# Patient Record
Sex: MALE | Race: WHITE | NOT HISPANIC OR LATINO
[De-identification: names, ages, dates, MRNs, and addresses within clinical notes are randomized per-mention and may not be internally consistent; named-entity substitution may affect disease eponyms.]

---

## 2018-10-02 ENCOUNTER — RX RENEWAL (OUTPATIENT)
Age: 58
End: 2018-10-02

## 2018-10-02 ENCOUNTER — APPOINTMENT (OUTPATIENT)
Dept: ENDOCRINOLOGY | Facility: CLINIC | Age: 58
End: 2018-10-02

## 2018-10-04 ENCOUNTER — RX RENEWAL (OUTPATIENT)
Age: 58
End: 2018-10-04

## 2018-10-15 ENCOUNTER — RX RENEWAL (OUTPATIENT)
Age: 58
End: 2018-10-15

## 2018-11-02 ENCOUNTER — APPOINTMENT (OUTPATIENT)
Dept: ENDOCRINOLOGY | Facility: CLINIC | Age: 58
End: 2018-11-02
Payer: COMMERCIAL

## 2018-11-02 VITALS
SYSTOLIC BLOOD PRESSURE: 116 MMHG | HEIGHT: 74 IN | HEART RATE: 76 BPM | WEIGHT: 219 LBS | DIASTOLIC BLOOD PRESSURE: 78 MMHG | BODY MASS INDEX: 28.11 KG/M2

## 2018-11-02 LAB — GLUCOSE BLDC GLUCOMTR-MCNC: 122

## 2018-11-02 PROCEDURE — 82962 GLUCOSE BLOOD TEST: CPT

## 2018-11-02 PROCEDURE — 99214 OFFICE O/P EST MOD 30 MIN: CPT | Mod: 25

## 2018-11-28 ENCOUNTER — MEDICATION RENEWAL (OUTPATIENT)
Age: 58
End: 2018-11-28

## 2019-03-11 ENCOUNTER — RECORD ABSTRACTING (OUTPATIENT)
Age: 59
End: 2019-03-11

## 2019-03-11 DIAGNOSIS — Z86.39 PERSONAL HISTORY OF OTHER ENDOCRINE, NUTRITIONAL AND METABOLIC DISEASE: ICD-10-CM

## 2019-03-11 DIAGNOSIS — Z78.9 OTHER SPECIFIED HEALTH STATUS: ICD-10-CM

## 2019-03-11 DIAGNOSIS — Z80.9 FAMILY HISTORY OF MALIGNANT NEOPLASM, UNSPECIFIED: ICD-10-CM

## 2019-03-11 DIAGNOSIS — Z83.3 FAMILY HISTORY OF DIABETES MELLITUS: ICD-10-CM

## 2019-03-13 ENCOUNTER — APPOINTMENT (OUTPATIENT)
Dept: ENDOCRINOLOGY | Facility: CLINIC | Age: 59
End: 2019-03-13
Payer: COMMERCIAL

## 2019-03-13 VITALS
WEIGHT: 219 LBS | SYSTOLIC BLOOD PRESSURE: 110 MMHG | BODY MASS INDEX: 28.11 KG/M2 | HEART RATE: 79 BPM | DIASTOLIC BLOOD PRESSURE: 68 MMHG | HEIGHT: 74 IN

## 2019-03-13 LAB — GLUCOSE BLDC GLUCOMTR-MCNC: 140

## 2019-03-13 PROCEDURE — 99214 OFFICE O/P EST MOD 30 MIN: CPT | Mod: 25

## 2019-03-13 PROCEDURE — 82962 GLUCOSE BLOOD TEST: CPT

## 2019-03-13 RX ORDER — ROSUVASTATIN CALCIUM 10 MG/1
10 TABLET, FILM COATED ORAL
Refills: 0 | Status: DISCONTINUED | COMMUNITY
End: 2019-03-13

## 2019-03-13 NOTE — REVIEW OF SYSTEMS
[Shortness Of Breath] : no shortness of breath [de-identified] : dizziness lasting seconds with position changes, no vertigo

## 2019-03-13 NOTE — HISTORY OF PRESENT ILLNESS
[FreeTextEntry1] : Type: 2\par Severity: moderate\par Duration 5 years:\par Onset: routine BW\par Modifying factors: better with medication\par \par Current meds for glycemic control:\par Januvia 100 mg daily\par Metformin 500 mg, 2 tabs BID\par Pioglitazone 30 mg daily.\par Was on Invokana in the past, but caused rash in the groin.\par No SMBG.\par \par Exercise: none,  so walks a lot at work\par \par

## 2019-03-13 NOTE — ASSESSMENT
[FreeTextEntry1] : DM type 2, controlled. Good response to pioglitazone in terms of glycemic status and liver function, without adverse effects\par Hyperlipidemia controlled\par ED improves with meds

## 2019-05-06 ENCOUNTER — RX RENEWAL (OUTPATIENT)
Age: 59
End: 2019-05-06

## 2019-05-29 ENCOUNTER — RX RENEWAL (OUTPATIENT)
Age: 59
End: 2019-05-29

## 2019-05-29 ENCOUNTER — MEDICATION RENEWAL (OUTPATIENT)
Age: 59
End: 2019-05-29

## 2019-07-30 LAB
GLUCOSE SERPL-MCNC: 152
HBA1C MFR BLD HPLC: 6.7
LDLC SERPL DIRECT ASSAY-MCNC: 81
MICROALBUMIN/CREAT UR-RTO: 10

## 2019-07-31 ENCOUNTER — APPOINTMENT (OUTPATIENT)
Dept: ENDOCRINOLOGY | Facility: CLINIC | Age: 59
End: 2019-07-31
Payer: COMMERCIAL

## 2019-07-31 VITALS
DIASTOLIC BLOOD PRESSURE: 82 MMHG | OXYGEN SATURATION: 98 % | WEIGHT: 220 LBS | HEIGHT: 74 IN | HEART RATE: 89 BPM | BODY MASS INDEX: 28.23 KG/M2 | SYSTOLIC BLOOD PRESSURE: 124 MMHG

## 2019-07-31 LAB — GLUCOSE BLDC GLUCOMTR-MCNC: 134

## 2019-07-31 PROCEDURE — 99214 OFFICE O/P EST MOD 30 MIN: CPT | Mod: 25

## 2019-07-31 PROCEDURE — 82962 GLUCOSE BLOOD TEST: CPT

## 2019-07-31 RX ORDER — SILDENAFIL CITRATE 100 MG/1
100 TABLET, FILM COATED ORAL
Refills: 0 | Status: DISCONTINUED | COMMUNITY
End: 2019-07-31

## 2019-07-31 NOTE — REVIEW OF SYSTEMS
[Joint Pain] : joint pain [Chest Pain] : no chest pain [Shortness Of Breath] : no shortness of breath

## 2019-09-30 ENCOUNTER — RX RENEWAL (OUTPATIENT)
Age: 59
End: 2019-09-30

## 2019-10-02 ENCOUNTER — MEDICATION RENEWAL (OUTPATIENT)
Age: 59
End: 2019-10-02

## 2019-11-21 ENCOUNTER — RX RENEWAL (OUTPATIENT)
Age: 59
End: 2019-11-21

## 2020-01-21 LAB
HBA1C MFR BLD HPLC: 7.4
LDLC SERPL DIRECT ASSAY-MCNC: 67
MICROALBUMIN/CREAT 24H UR-RTO: 18

## 2020-01-22 ENCOUNTER — APPOINTMENT (OUTPATIENT)
Dept: ENDOCRINOLOGY | Facility: CLINIC | Age: 60
End: 2020-01-22
Payer: COMMERCIAL

## 2020-01-22 VITALS
WEIGHT: 228 LBS | SYSTOLIC BLOOD PRESSURE: 118 MMHG | HEIGHT: 74 IN | HEART RATE: 76 BPM | BODY MASS INDEX: 29.26 KG/M2 | DIASTOLIC BLOOD PRESSURE: 74 MMHG

## 2020-01-22 DIAGNOSIS — Z00.00 ENCOUNTER FOR GENERAL ADULT MEDICAL EXAMINATION W/OUT ABNORMAL FINDINGS: ICD-10-CM

## 2020-01-22 DIAGNOSIS — N52.1 ERECTILE DYSFUNCTION DUE TO DISEASES CLASSIFIED ELSEWHERE: ICD-10-CM

## 2020-01-22 LAB — GLUCOSE BLDC GLUCOMTR-MCNC: 128

## 2020-01-22 PROCEDURE — 82962 GLUCOSE BLOOD TEST: CPT

## 2020-01-22 PROCEDURE — 99214 OFFICE O/P EST MOD 30 MIN: CPT | Mod: 25

## 2020-01-22 NOTE — REVIEW OF SYSTEMS
[Blurry Vision] : no blurred vision [Shortness Of Breath] : no shortness of breath [FreeTextEntry8] : ED improves with sildenafil [Chest Pain] : no chest pain

## 2020-01-22 NOTE — HISTORY OF PRESENT ILLNESS
[FreeTextEntry1] : Type: 2\par Severity: moderate\par Duration 6 years:\par Onset: routine BW\par Modifying factors: better with medication\par \par Current meds for glycemic control:\par Januvia 100 mg daily\par Metformin 500 mg, 2 tabs BID\par Pioglitazone 30 mg daily.\par Was on Invokana in the past, but caused rash in the groin.\par No SMBG.\par \par Exercise: none,  so walks a lot at work\par \par

## 2020-01-22 NOTE — ASSESSMENT
[FreeTextEntry1] : DM type 2, controlled. Good response to pioglitazone in terms of glycemic status and liver function, without adverse effects. Mild loss of control associated with weight gain\par Hyperlipidemia controlled\par ED improves with meds

## 2020-01-24 RX ORDER — SITAGLIPTIN 100 MG/1
100 TABLET, FILM COATED ORAL
Qty: 90 | Refills: 3 | Status: DISCONTINUED | COMMUNITY
Start: 2019-05-06 | End: 2020-01-24

## 2020-03-28 ENCOUNTER — RX RENEWAL (OUTPATIENT)
Age: 60
End: 2020-03-28

## 2020-05-02 ENCOUNTER — RX RENEWAL (OUTPATIENT)
Age: 60
End: 2020-05-02

## 2020-05-27 ENCOUNTER — APPOINTMENT (OUTPATIENT)
Dept: ENDOCRINOLOGY | Facility: CLINIC | Age: 60
End: 2020-05-27
Payer: COMMERCIAL

## 2020-05-27 LAB
HBA1C MFR BLD HPLC: 7.7
LDLC SERPL DIRECT ASSAY-MCNC: 68
MICROALBUMIN/CREAT 24H UR-RTO: 16

## 2020-05-27 PROCEDURE — 99442: CPT

## 2020-05-27 RX ORDER — LINAGLIPTIN 5 MG/1
5 TABLET, FILM COATED ORAL DAILY
Qty: 90 | Refills: 1 | Status: DISCONTINUED | COMMUNITY
Start: 2020-01-24 | End: 2020-05-27

## 2020-05-27 RX ORDER — SAXAGLIPTIN 5 MG/1
5 TABLET, FILM COATED ORAL
Qty: 90 | Refills: 1 | Status: DISCONTINUED | COMMUNITY
Start: 2020-01-29 | End: 2020-05-27

## 2020-06-04 ENCOUNTER — RX RENEWAL (OUTPATIENT)
Age: 60
End: 2020-06-04

## 2020-09-21 ENCOUNTER — RX RENEWAL (OUTPATIENT)
Age: 60
End: 2020-09-21

## 2020-10-13 LAB
HBA1C MFR BLD HPLC: 6.7
LDLC SERPL DIRECT ASSAY-MCNC: 66
MICROALBUMIN/CREAT 24H UR-RTO: 6

## 2020-10-14 ENCOUNTER — APPOINTMENT (OUTPATIENT)
Dept: ENDOCRINOLOGY | Facility: CLINIC | Age: 60
End: 2020-10-14
Payer: COMMERCIAL

## 2020-10-14 VITALS
HEIGHT: 74 IN | BODY MASS INDEX: 29.26 KG/M2 | SYSTOLIC BLOOD PRESSURE: 132 MMHG | DIASTOLIC BLOOD PRESSURE: 74 MMHG | WEIGHT: 228 LBS | HEART RATE: 78 BPM

## 2020-10-14 LAB — GLUCOSE BLDC GLUCOMTR-MCNC: 85

## 2020-10-14 PROCEDURE — 99214 OFFICE O/P EST MOD 30 MIN: CPT | Mod: 25

## 2020-10-14 PROCEDURE — 82962 GLUCOSE BLOOD TEST: CPT

## 2020-10-14 NOTE — HISTORY OF PRESENT ILLNESS
[FreeTextEntry1] : Type: 2\par Severity: moderate\par Duration 6 years:\par Onset: routine BW\par Modifying factors: better with medication\par \par Current meds for glycemic control:\par Glimepiride 1mg daily \par Metformin 500 mg, 2 tabs BID\par Pioglitazone 30 mg daily.\par Never started Rybelsus, discontinued Tradjenta \par Was on Invokana in the past, but caused rash in the groin.\par No SMBG.\par Current BS- 85 pt. reports that he has not ate all day \par \par Does not test very often, usually 120-130 when he does test\par \par Diet:\par B- skips\par L- skips\par D- chicken\par \par Eye exam- 09/2020 (-) \par \par Exercise: none,  so walks a lot at work

## 2020-10-14 NOTE — ASSESSMENT
[Importance of Diet and Exercise] : importance of diet and exercise to improve glycemic control, achieve weight loss and improve cardiovascular health [FreeTextEntry1] : 59 y/o male with Type 2 DM and Hyperlipidemia. \par \par Plan: \par Type 2 DM: A1C improved \par - continue current Rx\par - increase self blood sugar monitoring \par - encouraged to increase routine exercise \par - educated on healthy food choices \par - repeat A1C in 3 months\par \par Hyperlipidemia: controlled - continue statin \par - repeat lipids in 3 months \par \par RTO in 3 months

## 2020-10-14 NOTE — REVIEW OF SYSTEMS
[Blurred Vision] : blurred vision [Fatigue] : no fatigue [Decreased Appetite] : appetite not decreased [Dysphagia] : no dysphagia [Neck Pain] : no neck pain [Chest Pain] : no chest pain [Palpitations] : no palpitations [Shortness Of Breath] : no shortness of breath [Cough] : no cough [Constipation] : no constipation [Diarrhea] : no diarrhea [Polyuria] : no polyuria [Joint Pain] : no joint pain [Muscle Weakness] : no muscle weakness [Hair Loss] : no hair loss [Dry Skin] : no dry skin [Headaches] : no headaches [Tremors] : no tremors [Depression] : no depression [Anxiety] : no anxiety [Cold Intolerance] : no cold intolerance [Heat Intolerance] : no heat intolerance [FreeTextEntry3] : at times, saw opthalmology [Swelling] : no swelling

## 2020-11-04 ENCOUNTER — RX RENEWAL (OUTPATIENT)
Age: 60
End: 2020-11-04

## 2020-12-16 ENCOUNTER — RX RENEWAL (OUTPATIENT)
Age: 60
End: 2020-12-16

## 2021-01-21 ENCOUNTER — RX RENEWAL (OUTPATIENT)
Age: 61
End: 2021-01-21

## 2021-02-18 LAB
HBA1C MFR BLD HPLC: 7.2
LDLC SERPL DIRECT ASSAY-MCNC: 69
MICROALBUMIN/CREAT 24H UR-RTO: 64

## 2021-02-19 ENCOUNTER — APPOINTMENT (OUTPATIENT)
Dept: ENDOCRINOLOGY | Facility: CLINIC | Age: 61
End: 2021-02-19
Payer: COMMERCIAL

## 2021-02-19 VITALS
OXYGEN SATURATION: 98 % | DIASTOLIC BLOOD PRESSURE: 80 MMHG | BODY MASS INDEX: 30.8 KG/M2 | HEIGHT: 74 IN | HEART RATE: 74 BPM | WEIGHT: 240 LBS | SYSTOLIC BLOOD PRESSURE: 130 MMHG

## 2021-02-19 DIAGNOSIS — E11.9 TYPE 2 DIABETES MELLITUS W/OUT COMPLICATIONS: ICD-10-CM

## 2021-02-19 PROCEDURE — 99214 OFFICE O/P EST MOD 30 MIN: CPT

## 2021-02-19 PROCEDURE — 99072 ADDL SUPL MATRL&STAF TM PHE: CPT

## 2021-02-19 RX ORDER — ORAL SEMAGLUTIDE 3 MG/1
3 TABLET ORAL
Qty: 30 | Refills: 0 | Status: DISCONTINUED | COMMUNITY
Start: 2020-05-27 | End: 2021-02-19

## 2021-02-19 NOTE — HISTORY OF PRESENT ILLNESS
[FreeTextEntry1] : Type: 2\par Severity: moderate\par Duration 7 years:\par Onset: routine BW\par Modifying factors: better with medication\par \par Current meds for glycemic control:\par Glimepiride 1mg daily \par Metformin 500 mg, 2 tabs BID\par Pioglitazone 30 mg daily.\par Never started Rybelsus, discontinued Tradjenta \par Was on Invokana in the past, but caused rash in the groin.\par No SMBG.\par Current BS- 85 pt. reports that he has not ate all day \par \par Does not test very often, usually 120-130 when he does test\par \par Diet:\par B- skips\par L- skips\par D- chicken\par \par Eye exam- 09/2020 (-) \par \par Exercise: none,  so walks a lot at work

## 2021-02-19 NOTE — ASSESSMENT
[Importance of Diet and Exercise] : importance of diet and exercise to improve glycemic control, achieve weight loss and improve cardiovascular health [FreeTextEntry1] : stable control of diabetes and hyperlipidemia\par positive microalbumin; will repeat\par encourage lifestyle efforts\par

## 2021-02-21 ENCOUNTER — RX RENEWAL (OUTPATIENT)
Age: 61
End: 2021-02-21

## 2021-04-02 ENCOUNTER — RX RENEWAL (OUTPATIENT)
Age: 61
End: 2021-04-02

## 2021-05-13 ENCOUNTER — RX RENEWAL (OUTPATIENT)
Age: 61
End: 2021-05-13

## 2021-05-17 ENCOUNTER — RX RENEWAL (OUTPATIENT)
Age: 61
End: 2021-05-17

## 2021-06-15 ENCOUNTER — RX RENEWAL (OUTPATIENT)
Age: 61
End: 2021-06-15

## 2021-06-29 LAB
HBA1C MFR BLD HPLC: 7.5
LDLC SERPL DIRECT ASSAY-MCNC: 81
MICROALBUMIN/CREAT 24H UR-RTO: 12

## 2021-06-30 ENCOUNTER — APPOINTMENT (OUTPATIENT)
Dept: ENDOCRINOLOGY | Facility: CLINIC | Age: 61
End: 2021-06-30
Payer: COMMERCIAL

## 2021-06-30 VITALS
HEART RATE: 80 BPM | OXYGEN SATURATION: 96 % | HEIGHT: 74 IN | WEIGHT: 239 LBS | BODY MASS INDEX: 30.67 KG/M2 | DIASTOLIC BLOOD PRESSURE: 74 MMHG | SYSTOLIC BLOOD PRESSURE: 120 MMHG

## 2021-06-30 LAB — GLUCOSE BLDC GLUCOMTR-MCNC: 160

## 2021-06-30 PROCEDURE — 99072 ADDL SUPL MATRL&STAF TM PHE: CPT

## 2021-06-30 PROCEDURE — 82962 GLUCOSE BLOOD TEST: CPT

## 2021-06-30 PROCEDURE — 99214 OFFICE O/P EST MOD 30 MIN: CPT | Mod: 25

## 2021-06-30 NOTE — ASSESSMENT
[FreeTextEntry1] : suboptimal control of diabetes. Add rybelsus, and hopefully eventually reduce glimepiride\par negative microalbumin\par encourage lifestyle efforts\par

## 2021-09-23 ENCOUNTER — RX RENEWAL (OUTPATIENT)
Age: 61
End: 2021-09-23

## 2021-10-12 LAB
HBA1C MFR BLD HPLC: 7.4
LDLC SERPL CALC-MCNC: 71
MICROALBUMIN/CREAT 24H UR-RTO: 16

## 2021-10-13 ENCOUNTER — APPOINTMENT (OUTPATIENT)
Dept: ENDOCRINOLOGY | Facility: CLINIC | Age: 61
End: 2021-10-13
Payer: COMMERCIAL

## 2021-10-13 VITALS
OXYGEN SATURATION: 96 % | WEIGHT: 237 LBS | SYSTOLIC BLOOD PRESSURE: 122 MMHG | DIASTOLIC BLOOD PRESSURE: 74 MMHG | HEIGHT: 74 IN | HEART RATE: 90 BPM | BODY MASS INDEX: 30.42 KG/M2

## 2021-10-13 LAB — GLUCOSE BLDC GLUCOMTR-MCNC: 145

## 2021-10-13 PROCEDURE — 99214 OFFICE O/P EST MOD 30 MIN: CPT | Mod: 25

## 2021-10-13 PROCEDURE — 82962 GLUCOSE BLOOD TEST: CPT

## 2021-10-13 RX ORDER — ORAL SEMAGLUTIDE 3 MG/1
3 TABLET ORAL
Qty: 30 | Refills: 3 | Status: DISCONTINUED | COMMUNITY
Start: 2021-06-30 | End: 2021-10-13

## 2021-10-13 NOTE — HISTORY OF PRESENT ILLNESS
[FreeTextEntry1] : Type: 2\par Severity: moderate\par Duration 7 years:\par Onset: routine BW\par Modifying factors: better with medication\par \par Current meds for glycemic control:\par Glimepiride 1mg daily \par Metformin 500 mg, 2 tabs BID\par Pioglitazone 15 mg two daily\par Unable to tolerate rybelsus due to GI side effects\par Was on Invokana in the past, but caused rash in the groin.\par No SMBG.\par Current BS- 85 pt. reports that he has not ate all day \par \par Does not test very often, usually 120-130 when he does test\par \par Diet:\par B- skips\par L- skips\par D- chicken\par \par Eye exam- 09/2020 (-) \par \par Exercise: none,  so walks a lot at work

## 2021-10-27 ENCOUNTER — RX RENEWAL (OUTPATIENT)
Age: 61
End: 2021-10-27

## 2021-11-07 ENCOUNTER — RX RENEWAL (OUTPATIENT)
Age: 61
End: 2021-11-07

## 2021-11-22 ENCOUNTER — RX RENEWAL (OUTPATIENT)
Age: 61
End: 2021-11-22

## 2022-01-24 ENCOUNTER — RX RENEWAL (OUTPATIENT)
Age: 62
End: 2022-01-24

## 2022-01-26 ENCOUNTER — RX RENEWAL (OUTPATIENT)
Age: 62
End: 2022-01-26

## 2022-02-15 LAB
HBA1C MFR BLD HPLC: 8
LDLC SERPL DIRECT ASSAY-MCNC: 77
MICROALBUMIN/CREAT 24H UR-RTO: 22

## 2022-02-16 ENCOUNTER — APPOINTMENT (OUTPATIENT)
Dept: ENDOCRINOLOGY | Facility: CLINIC | Age: 62
End: 2022-02-16
Payer: COMMERCIAL

## 2022-02-16 VITALS
SYSTOLIC BLOOD PRESSURE: 135 MMHG | WEIGHT: 240 LBS | DIASTOLIC BLOOD PRESSURE: 80 MMHG | OXYGEN SATURATION: 97 % | HEIGHT: 74 IN | BODY MASS INDEX: 30.8 KG/M2 | HEART RATE: 82 BPM

## 2022-02-16 DIAGNOSIS — E11.65 TYPE 2 DIABETES MELLITUS WITH HYPERGLYCEMIA: ICD-10-CM

## 2022-02-16 LAB — GLUCOSE BLDC GLUCOMTR-MCNC: 134

## 2022-02-16 PROCEDURE — 82962 GLUCOSE BLOOD TEST: CPT

## 2022-02-16 PROCEDURE — 99214 OFFICE O/P EST MOD 30 MIN: CPT | Mod: 25

## 2022-02-16 NOTE — ASSESSMENT
[FreeTextEntry1] : Stable but suboptimal control of diabetes. Increase glimepiride to 1 mg two dailiy\par negative microalbumin\par hyperlipidemia controlled\par

## 2022-02-16 NOTE — HISTORY OF PRESENT ILLNESS
[FreeTextEntry1] : Type: 2\par Severity: moderate (IR 4.2)\par Duration 8 years:\par Onset: routine BW\par Modifying factors: better with medication\par \par Current meds for glycemic control:\par Glimepiride 1mg daily \par Metformin 500 mg, 2 tabs BID - has only been taking two daily\par Pioglitazone 15 mg two daily\par Unable to tolerate rybelsus due to GI side effects\par Was on Invokana in the past, but caused rash in the groin.\par No SMBG.\par Current BS- 85 pt. reports that he has not ate all day \par \par Does not test very often, usually 120-130 when he does test\par \par Diet:\par B- skips\par L- skips\par D- chicken\par \par Eye exam- 09/2020 (-) \par \par Exercise: none,  so walks a lot at work

## 2022-03-14 ENCOUNTER — INPATIENT (INPATIENT)
Facility: HOSPITAL | Age: 62
LOS: 1 days | Discharge: HOME | DRG: 93 | End: 2022-03-16
Attending: HOSPITALIST | Admitting: FAMILY MEDICINE
Payer: COMMERCIAL

## 2022-03-14 VITALS
SYSTOLIC BLOOD PRESSURE: 175 MMHG | OXYGEN SATURATION: 96 % | DIASTOLIC BLOOD PRESSURE: 81 MMHG | RESPIRATION RATE: 18 BRPM | HEIGHT: 74 IN | WEIGHT: 235.01 LBS | HEART RATE: 75 BPM | TEMPERATURE: 98 F

## 2022-03-14 DIAGNOSIS — Z98.890 OTHER SPECIFIED POSTPROCEDURAL STATES: Chronic | ICD-10-CM

## 2022-03-14 DIAGNOSIS — R47.81 SLURRED SPEECH: ICD-10-CM

## 2022-03-14 LAB
ALBUMIN SERPL ELPH-MCNC: 4.3 G/DL — SIGNIFICANT CHANGE UP (ref 3.3–5.2)
ALP SERPL-CCNC: 77 U/L — SIGNIFICANT CHANGE UP (ref 40–120)
ALT FLD-CCNC: 40 U/L — SIGNIFICANT CHANGE UP
ANION GAP SERPL CALC-SCNC: 11 MMOL/L — SIGNIFICANT CHANGE UP (ref 5–17)
APTT BLD: 30.3 SEC — SIGNIFICANT CHANGE UP (ref 27.5–35.5)
AST SERPL-CCNC: 23 U/L — SIGNIFICANT CHANGE UP
BASOPHILS # BLD AUTO: 0.02 K/UL — SIGNIFICANT CHANGE UP (ref 0–0.2)
BASOPHILS NFR BLD AUTO: 0.3 % — SIGNIFICANT CHANGE UP (ref 0–2)
BILIRUB SERPL-MCNC: 0.9 MG/DL — SIGNIFICANT CHANGE UP (ref 0.4–2)
BUN SERPL-MCNC: 14.1 MG/DL — SIGNIFICANT CHANGE UP (ref 8–20)
CALCIUM SERPL-MCNC: 9.1 MG/DL — SIGNIFICANT CHANGE UP (ref 8.6–10.2)
CHLORIDE SERPL-SCNC: 99 MMOL/L — SIGNIFICANT CHANGE UP (ref 98–107)
CO2 SERPL-SCNC: 26 MMOL/L — SIGNIFICANT CHANGE UP (ref 22–29)
CREAT SERPL-MCNC: 0.63 MG/DL — SIGNIFICANT CHANGE UP (ref 0.5–1.3)
EGFR: 108 ML/MIN/1.73M2 — SIGNIFICANT CHANGE UP
EOSINOPHIL # BLD AUTO: 0.07 K/UL — SIGNIFICANT CHANGE UP (ref 0–0.5)
EOSINOPHIL NFR BLD AUTO: 1.1 % — SIGNIFICANT CHANGE UP (ref 0–6)
GLUCOSE SERPL-MCNC: 140 MG/DL — HIGH (ref 70–99)
HCT VFR BLD CALC: 42.2 % — SIGNIFICANT CHANGE UP (ref 39–50)
HGB BLD-MCNC: 13.7 G/DL — SIGNIFICANT CHANGE UP (ref 13–17)
IMM GRANULOCYTES NFR BLD AUTO: 0.3 % — SIGNIFICANT CHANGE UP (ref 0–1.5)
INR BLD: 0.93 RATIO — SIGNIFICANT CHANGE UP (ref 0.88–1.16)
LYMPHOCYTES # BLD AUTO: 1.48 K/UL — SIGNIFICANT CHANGE UP (ref 1–3.3)
LYMPHOCYTES # BLD AUTO: 22.6 % — SIGNIFICANT CHANGE UP (ref 13–44)
MAGNESIUM SERPL-MCNC: 2 MG/DL — SIGNIFICANT CHANGE UP (ref 1.6–2.6)
MCHC RBC-ENTMCNC: 30.4 PG — SIGNIFICANT CHANGE UP (ref 27–34)
MCHC RBC-ENTMCNC: 32.5 GM/DL — SIGNIFICANT CHANGE UP (ref 32–36)
MCV RBC AUTO: 93.8 FL — SIGNIFICANT CHANGE UP (ref 80–100)
MONOCYTES # BLD AUTO: 0.57 K/UL — SIGNIFICANT CHANGE UP (ref 0–0.9)
MONOCYTES NFR BLD AUTO: 8.7 % — SIGNIFICANT CHANGE UP (ref 2–14)
NEUTROPHILS # BLD AUTO: 4.38 K/UL — SIGNIFICANT CHANGE UP (ref 1.8–7.4)
NEUTROPHILS NFR BLD AUTO: 67 % — SIGNIFICANT CHANGE UP (ref 43–77)
NT-PROBNP SERPL-SCNC: 33 PG/ML — SIGNIFICANT CHANGE UP (ref 0–300)
PLATELET # BLD AUTO: 297 K/UL — SIGNIFICANT CHANGE UP (ref 150–400)
POTASSIUM SERPL-MCNC: 4.9 MMOL/L — SIGNIFICANT CHANGE UP (ref 3.5–5.3)
POTASSIUM SERPL-SCNC: 4.9 MMOL/L — SIGNIFICANT CHANGE UP (ref 3.5–5.3)
PROT SERPL-MCNC: 7.1 G/DL — SIGNIFICANT CHANGE UP (ref 6.6–8.7)
PROTHROM AB SERPL-ACNC: 10.8 SEC — SIGNIFICANT CHANGE UP (ref 10.5–13.4)
RBC # BLD: 4.5 M/UL — SIGNIFICANT CHANGE UP (ref 4.2–5.8)
RBC # FLD: 14 % — SIGNIFICANT CHANGE UP (ref 10.3–14.5)
SARS-COV-2 RNA SPEC QL NAA+PROBE: SIGNIFICANT CHANGE UP
SODIUM SERPL-SCNC: 136 MMOL/L — SIGNIFICANT CHANGE UP (ref 135–145)
TROPONIN T SERPL-MCNC: <0.01 NG/ML — SIGNIFICANT CHANGE UP (ref 0–0.06)
WBC # BLD: 6.54 K/UL — SIGNIFICANT CHANGE UP (ref 3.8–10.5)
WBC # FLD AUTO: 6.54 K/UL — SIGNIFICANT CHANGE UP (ref 3.8–10.5)

## 2022-03-14 PROCEDURE — 93971 EXTREMITY STUDY: CPT | Mod: 26,LT

## 2022-03-14 PROCEDURE — 93010 ELECTROCARDIOGRAM REPORT: CPT

## 2022-03-14 PROCEDURE — 71045 X-RAY EXAM CHEST 1 VIEW: CPT | Mod: 26

## 2022-03-14 PROCEDURE — 72125 CT NECK SPINE W/O DYE: CPT | Mod: 26,MA

## 2022-03-14 PROCEDURE — 99285 EMERGENCY DEPT VISIT HI MDM: CPT

## 2022-03-14 PROCEDURE — 70450 CT HEAD/BRAIN W/O DYE: CPT | Mod: 26,MA

## 2022-03-14 PROCEDURE — 99223 1ST HOSP IP/OBS HIGH 75: CPT

## 2022-03-14 RX ORDER — DEXTROSE 50 % IN WATER 50 %
15 SYRINGE (ML) INTRAVENOUS ONCE
Refills: 0 | Status: DISCONTINUED | OUTPATIENT
Start: 2022-03-14 | End: 2022-03-16

## 2022-03-14 RX ORDER — GLUCAGON INJECTION, SOLUTION 0.5 MG/.1ML
1 INJECTION, SOLUTION SUBCUTANEOUS ONCE
Refills: 0 | Status: DISCONTINUED | OUTPATIENT
Start: 2022-03-14 | End: 2022-03-16

## 2022-03-14 RX ORDER — INSULIN LISPRO 100/ML
VIAL (ML) SUBCUTANEOUS
Refills: 0 | Status: DISCONTINUED | OUTPATIENT
Start: 2022-03-14 | End: 2022-03-16

## 2022-03-14 RX ORDER — DEXTROSE 50 % IN WATER 50 %
12.5 SYRINGE (ML) INTRAVENOUS ONCE
Refills: 0 | Status: DISCONTINUED | OUTPATIENT
Start: 2022-03-14 | End: 2022-03-16

## 2022-03-14 RX ORDER — ENOXAPARIN SODIUM 100 MG/ML
40 INJECTION SUBCUTANEOUS EVERY 24 HOURS
Refills: 0 | Status: DISCONTINUED | OUTPATIENT
Start: 2022-03-14 | End: 2022-03-16

## 2022-03-14 RX ORDER — DEXTROSE 50 % IN WATER 50 %
25 SYRINGE (ML) INTRAVENOUS ONCE
Refills: 0 | Status: DISCONTINUED | OUTPATIENT
Start: 2022-03-14 | End: 2022-03-16

## 2022-03-14 RX ORDER — SODIUM CHLORIDE 9 MG/ML
1000 INJECTION, SOLUTION INTRAVENOUS
Refills: 0 | Status: DISCONTINUED | OUTPATIENT
Start: 2022-03-14 | End: 2022-03-16

## 2022-03-14 RX ORDER — ASPIRIN/CALCIUM CARB/MAGNESIUM 324 MG
81 TABLET ORAL DAILY
Refills: 0 | Status: DISCONTINUED | OUTPATIENT
Start: 2022-03-14 | End: 2022-03-16

## 2022-03-14 RX ORDER — ATORVASTATIN CALCIUM 80 MG/1
80 TABLET, FILM COATED ORAL AT BEDTIME
Refills: 0 | Status: DISCONTINUED | OUTPATIENT
Start: 2022-03-14 | End: 2022-03-16

## 2022-03-14 RX ADMIN — ATORVASTATIN CALCIUM 80 MILLIGRAM(S): 80 TABLET, FILM COATED ORAL at 23:24

## 2022-03-14 RX ADMIN — Medication 81 MILLIGRAM(S): at 19:15

## 2022-03-14 NOTE — ED PROVIDER NOTE - CLINICAL SUMMARY MEDICAL DECISION MAKING FREE TEXT BOX
patient with slurred speech, facial droop and twitching.  ct with no acute findings. will admit for mri and further eval.

## 2022-03-14 NOTE — ED ADULT NURSE NOTE - OBJECTIVE STATEMENT
Late note time approx   61 M, nad, alert and oriented x 3 c/o sensation of pulling to left side of face and neck with increasing aphasia x 3 days. Pt was at work today and felt his speech was worsening and came in. Cardiac and SpO2 monitoring in place, fall precautions in place, wife at bedside. Full physical and neuro assessment deferred to physician at this time due to high census and acuity of department.

## 2022-03-14 NOTE — ED PROVIDER NOTE - PHYSICAL EXAMINATION
Gen: Alert, NAD  Head: NC, AT, PERRL, EOMI, normal lids/conjunctiva  ENT: B TM WNL, normal hearing, patent oropharynx without erythema/exudate, uvula midline  Neck: +supple, no tenderness/meningismus/JVD, +Trachea midline  Pulm: Bilateral BS, normal resp effort, no wheeze/stridor/retractions  CV: RRR, no M/R/G, +dist pulses  Abd: soft, NT/ND, +BS, no hepatosplenomegaly  Mskel: no edema/erythema/cyanosis  Skin: no rash  Neuro: AAOx3, no sensory/motor deficits, CN 2-12 intact A&Ox3, NIH=1 Gen: Alert, NAD  Head: NC, AT, PERRL, EOMI, normal lids/conjunctiva  ENT: B TM WNL, normal hearing, patent oropharynx without erythema/exudate, uvula midline  Neck: +supple, no tenderness/meningismus/JVD, +Trachea midline  Pulm: Bilateral BS, normal resp effort, no wheeze/stridor/retractions  CV: RRR, no M/R/G, +dist pulses  Abd: soft, NT/ND, +BS, no hepatosplenomegaly  Mskel: no edema/erythema/cyanosis  Skin: no rash  Neuro: AAOx3, no sensory/motor deficits, left facial droop, slurred speech, NIH=2

## 2022-03-14 NOTE — ED PROVIDER NOTE - NS ED ROS FT
Constitutional: (-) fever  (-)chills  (-)sweats  Eyes/ENT: (-) blurry vision, (-) epistaxis  (-)rhinorrhea   (-) sore throat    Cardiovascular: (-) chest pain, (-) palpitations (-) edema   Respiratory: (-) cough, (-) shortness of breath   Gastrointestinal: (-)nausea  (-)vomiting, (-) diarrhea  (-) abdominal pain   :  (-)dysuria, (-)frequency, (-)urgency, (-)hematuria  Musculoskeletal: (-) neck pain, (-) back pain, (-) joint pain  Integumentary: (-) rash, (-) edema  Neurological: (+) headache, (-) altered mental status  (-)LOC, (+) slurred speech

## 2022-03-14 NOTE — ED ADULT TRIAGE NOTE - CHIEF COMPLAINT QUOTE
Patient arrived to ED today with c/o a couple of days of twitching to his left eye, slurring his words, neck tightness.  Patient states word slurring on Saturday.

## 2022-03-14 NOTE — H&P ADULT - HISTORY OF PRESENT ILLNESS
61yr old male with PMH of DM, HPL presented with sudden onset slurred speech 48hrs ago, headache for last 1 week, left sided facial twitches since yesterday. in ER, he was found to be hypertensive 175/81, CT hea d- neg for acute CVA is being admitted for further care. Reports Left sided symptoms, left sided Leg swelling - chronic, had worked up in past with neg duplex, saw a vascular surgeon - wears compression stockings.

## 2022-03-14 NOTE — H&P ADULT - ASSESSMENT
61yr old male with PMH of DM, HPL presented with sudden onset slurred speech 48hrs ago, headache for last 1 week, left sided facial twitches since yesterday. in ER, he was found to be hypertensive 175/81, CT head- neg for acute CVA is being admitted for further care.       # Slurred speech and Left sided myoclonic jerks - r/o CVA , doubt seizures   Associated with HA  MR head  neuro consulted and case discussed   Swallow eval   asa, statin  carotid duplex, ECHO per neuro recs     # Elevated BP - not diagnosed hypertensive   monitor for now     # LLE swelling  - r/o Duplex     # DM-2 - Controlled - follows with Dr Greene  SSI#2  start CC diet when passes dysphagia screen     # HPL - ct statin, check lipid panel     # DVT Px - Lovenox

## 2022-03-14 NOTE — ED ADULT NURSE REASSESSMENT NOTE - NS ED NURSE REASSESS COMMENT FT1
Late Note  Report given at change of shift to oncoming MARILYNN Brown and pt endorsed to same for follow up and continuity of care.
Message left on Dr Ashley's voicemail that pt passed dyspagia screen and requires diet order as per Dr Ashley's order await reply.
no

## 2022-03-14 NOTE — ED PROVIDER NOTE - OBJECTIVE STATEMENT
HPI:  61 yoM with PMH signif for DM now p/w slurred speech associated w/ left sided eye and neck tightness/strain. PT admits to blurry vision in left eye, and posterior HA x1 week w/ left side worse than right. PT admits to CT x2 weeks ago for inner ear. Wife at bedside states PT's nervous habits have worsened over the past few days with new onset habits but denies diagnosis of Tourette's.  PT denies fever, chills, sweats, n/v/d, abdominal pain, chest pain, SOB, urinary symptoms, recent falls or trauma, smoking, drug use. PT admits to x2-3 beer intake per day.  PMH: DM  SOCIAL: + EtOH use, denies tobacco/illicit drug use

## 2022-03-14 NOTE — H&P ADULT - NSHPPHYSICALEXAM_GEN_ALL_CORE
PHYSICAL EXAM:    GENERAL: NAD, well-groomed, well-developed  HEAD:  Atraumatic, Normocephalic  EYES: EOMI, PERRLA, conjunctiva and sclera clear  ENMT: No tonsillar erythema, exudates, or enlargement; Moist mucous membranes, Good dentition, No lesions  NECK: Supple, No JVD  NERVOUS SYSTEM:  Alert & Oriented X3, Good concentration; left sided facial and neck myoclonic jerks+ Motor Strength 5/5 B/L upper and lower extremities; DTRs 2+ intact and symmetric  CHEST/LUNG: Clear to percussion bilaterally; No rales, rhonchi  HEART: Regular rate and rhythm; No murmurs  ABDOMEN: Soft, Nontender, Nondistended; Bowel sounds present  EXTREMITIES: No clubbing, cyanosis. left pitting pedal edema 2+  SKIN: No rashes or lesions

## 2022-03-15 LAB
A1C WITH ESTIMATED AVERAGE GLUCOSE RESULT: 7.9 % — HIGH (ref 4–5.6)
ANION GAP SERPL CALC-SCNC: 11 MMOL/L — SIGNIFICANT CHANGE UP (ref 5–17)
BUN SERPL-MCNC: 14.9 MG/DL — SIGNIFICANT CHANGE UP (ref 8–20)
CALCIUM SERPL-MCNC: 8.7 MG/DL — SIGNIFICANT CHANGE UP (ref 8.6–10.2)
CHLORIDE SERPL-SCNC: 101 MMOL/L — SIGNIFICANT CHANGE UP (ref 98–107)
CHOLEST SERPL-MCNC: 153 MG/DL — SIGNIFICANT CHANGE UP
CO2 SERPL-SCNC: 25 MMOL/L — SIGNIFICANT CHANGE UP (ref 22–29)
CREAT SERPL-MCNC: 0.68 MG/DL — SIGNIFICANT CHANGE UP (ref 0.5–1.3)
EGFR: 105 ML/MIN/1.73M2 — SIGNIFICANT CHANGE UP
ESTIMATED AVERAGE GLUCOSE: 180 MG/DL — HIGH (ref 68–114)
GLUCOSE BLDC GLUCOMTR-MCNC: 167 MG/DL — HIGH (ref 70–99)
GLUCOSE BLDC GLUCOMTR-MCNC: 174 MG/DL — HIGH (ref 70–99)
GLUCOSE BLDC GLUCOMTR-MCNC: 212 MG/DL — HIGH (ref 70–99)
GLUCOSE SERPL-MCNC: 182 MG/DL — HIGH (ref 70–99)
HCV AB S/CO SERPL IA: 0.09 S/CO — SIGNIFICANT CHANGE UP (ref 0–0.99)
HCV AB SERPL-IMP: SIGNIFICANT CHANGE UP
HDLC SERPL-MCNC: 53 MG/DL — SIGNIFICANT CHANGE UP
LIPID PNL WITH DIRECT LDL SERPL: 63 MG/DL — SIGNIFICANT CHANGE UP
NON HDL CHOLESTEROL: 100 MG/DL — SIGNIFICANT CHANGE UP
POTASSIUM SERPL-MCNC: 4 MMOL/L — SIGNIFICANT CHANGE UP (ref 3.5–5.3)
POTASSIUM SERPL-SCNC: 4 MMOL/L — SIGNIFICANT CHANGE UP (ref 3.5–5.3)
SODIUM SERPL-SCNC: 137 MMOL/L — SIGNIFICANT CHANGE UP (ref 135–145)
TRIGL SERPL-MCNC: 187 MG/DL — HIGH

## 2022-03-15 PROCEDURE — 70551 MRI BRAIN STEM W/O DYE: CPT | Mod: 26

## 2022-03-15 PROCEDURE — 95720 EEG PHY/QHP EA INCR W/VEEG: CPT

## 2022-03-15 PROCEDURE — 99233 SBSQ HOSP IP/OBS HIGH 50: CPT

## 2022-03-15 PROCEDURE — 70498 CT ANGIOGRAPHY NECK: CPT | Mod: 26

## 2022-03-15 PROCEDURE — 99222 1ST HOSP IP/OBS MODERATE 55: CPT

## 2022-03-15 PROCEDURE — 70496 CT ANGIOGRAPHY HEAD: CPT | Mod: 26

## 2022-03-15 RX ADMIN — Medication 81 MILLIGRAM(S): at 08:42

## 2022-03-15 RX ADMIN — ATORVASTATIN CALCIUM 80 MILLIGRAM(S): 80 TABLET, FILM COATED ORAL at 22:17

## 2022-03-15 RX ADMIN — Medication 4: at 17:25

## 2022-03-15 RX ADMIN — Medication 1 MILLIGRAM(S): at 01:37

## 2022-03-15 RX ADMIN — ENOXAPARIN SODIUM 40 MILLIGRAM(S): 100 INJECTION SUBCUTANEOUS at 08:42

## 2022-03-15 RX ADMIN — Medication 1 MILLIGRAM(S): at 01:33

## 2022-03-15 RX ADMIN — Medication 2: at 12:15

## 2022-03-15 NOTE — CONSULT NOTE ADULT - SUBJECTIVE AND OBJECTIVE BOX
Neurology Consult Note  [facility] [place] [address] [place] [address]  [phone]    HPI:  60 yo man with medical conditions as outlined below who presents for further evaluation of headache, left sided tingling, blurry vision and mouth twitching. He states a week ago, he began to get a 3-4/10 aching occipital headache. He then developed blurry vision when looking and things close to him. He started to feel tingling on the left side  of his face and in his left leg. He states the tingling has remained constant. His wife states that intermittently, he will get slurred speech and then will stop talking or walk away because she feels he does not want her to see him like that. She states he will have a "nervous tic" where he will make a smiling movement and then stop. She states he otherwise has been communicating appropriately. He denies weakness or unsteady gait. He denies recent fever. He denies a personal or family history of epilepsy or seizures. He denies learning difficulty growing up, recent illness, or prior head trauma. He had an MRI brain w/o contrast upon admission which was unremarkable. He has no further complaints.    PMHx:  DM  HLD    PSHx:  s/p ear surgery    Meds:  MEDICATIONS  (STANDING):  aspirin  chewable 81 milliGRAM(s) Oral daily  atorvastatin 80 milliGRAM(s) Oral at bedtime  dextrose 40% Gel 15 Gram(s) Oral once  dextrose 5%. 1000 milliLiter(s) (50 mL/Hr) IV Continuous <Continuous>  dextrose 5%. 1000 milliLiter(s) (100 mL/Hr) IV Continuous <Continuous>  dextrose 50% Injectable 25 Gram(s) IV Push once  dextrose 50% Injectable 12.5 Gram(s) IV Push once  dextrose 50% Injectable 25 Gram(s) IV Push once  enoxaparin Injectable 40 milliGRAM(s) SubCutaneous every 24 hours  glucagon  Injectable 1 milliGRAM(s) IntraMuscular once  insulin lispro (ADMELOG) corrective regimen sliding scale   SubCutaneous three times a day before meals    Allergies:  NKA    FamHx:  no pertinent family history    SocHx:  denies smoking   drinks beer occasionally    ROS: A 12 system review of system was negative aside from pertinent negatives and positives outlined in HPI    Physical Exam  Vital Signs Last 24 Hrs  T(C): 36.5 (15 Mar 2022 08:54), Max: 36.7 (15 Mar 2022 02:24)  T(F): 97.7 (15 Mar 2022 08:54), Max: 98.1 (15 Mar 2022 02:24)  HR: 74 (15 Mar 2022 08:54) (63 - 75)  BP: 142/85 (15 Mar 2022 08:54) (142/85 - 175/81)  BP(mean): --  RR: 18 (15 Mar 2022 08:54) (18 - 18)  SpO2: 96% (15 Mar 2022 08:54) (96% - 98%)  General: no acute distress  HEENT:NC/AT  Lungs: no respiratory distress  Skin: no rash or ecchymoses  Lower extremities: no edema    Mental Status: AAO x 3, no dysarthria, no aphasia  CN: PERRL, EOMI, VFF, V1-V3 sensation intact, no facial asymmetry  Motor:  5/5 x 4 extremities  Sensory: intact to light touch throughout  Coordination: no dysmetria on FTN  Gait: deferred    MRI brain w/o contrast - unremarkable, personally reviewed    CTA H/N- unremarkable

## 2022-03-15 NOTE — CONSULT NOTE ADULT - ASSESSMENT
60 yo man with left sided tingling, mouth twitching and intermittent slurred speech of unclear etiology    Left sided tingling/Mouth twitching  Recommend continuous video EEG for further characterization of intermittent slurred speech and mouth twitching   No indication for ASM at this time    Plan discussed with patient, wife and Dr. Ashley    Will continue to follow

## 2022-03-15 NOTE — PATIENT PROFILE ADULT - FALL HARM RISK - UNIVERSAL INTERVENTIONS
Bed in lowest position, wheels locked, appropriate side rails in place/Call bell, personal items and telephone in reach/Instruct patient to call for assistance before getting out of bed or chair/Non-slip footwear when patient is out of bed/Nora to call system/Physically safe environment - no spills, clutter or unnecessary equipment/Purposeful Proactive Rounding/Room/bathroom lighting operational, light cord in reach

## 2022-03-15 NOTE — PATIENT PROFILE ADULT - DOES PATIENT HAVE ADVANCE DIRECTIVE
Spoke with pt after receiving communication from ROD Gordillo, to overbook him on Dr. Julien's scheduled on 1130 on 6/29. Discussed this appt with him and pt voiced understanding. Details confirmed.  
No

## 2022-03-16 ENCOUNTER — TRANSCRIPTION ENCOUNTER (OUTPATIENT)
Age: 62
End: 2022-03-16

## 2022-03-16 VITALS
SYSTOLIC BLOOD PRESSURE: 149 MMHG | OXYGEN SATURATION: 94 % | RESPIRATION RATE: 18 BRPM | DIASTOLIC BLOOD PRESSURE: 76 MMHG | TEMPERATURE: 98 F | HEART RATE: 71 BPM

## 2022-03-16 LAB
GLUCOSE BLDC GLUCOMTR-MCNC: 134 MG/DL — HIGH (ref 70–99)
GLUCOSE BLDC GLUCOMTR-MCNC: 192 MG/DL — HIGH (ref 70–99)

## 2022-03-16 PROCEDURE — 86803 HEPATITIS C AB TEST: CPT

## 2022-03-16 PROCEDURE — 70450 CT HEAD/BRAIN W/O DYE: CPT | Mod: MA

## 2022-03-16 PROCEDURE — 95714 VEEG EA 12-26 HR UNMNTR: CPT

## 2022-03-16 PROCEDURE — 85610 PROTHROMBIN TIME: CPT

## 2022-03-16 PROCEDURE — 85025 COMPLETE CBC W/AUTO DIFF WBC: CPT

## 2022-03-16 PROCEDURE — 99239 HOSP IP/OBS DSCHRG MGMT >30: CPT

## 2022-03-16 PROCEDURE — 83880 ASSAY OF NATRIURETIC PEPTIDE: CPT

## 2022-03-16 PROCEDURE — 71045 X-RAY EXAM CHEST 1 VIEW: CPT

## 2022-03-16 PROCEDURE — 72125 CT NECK SPINE W/O DYE: CPT | Mod: MA

## 2022-03-16 PROCEDURE — 93971 EXTREMITY STUDY: CPT

## 2022-03-16 PROCEDURE — 99232 SBSQ HOSP IP/OBS MODERATE 35: CPT

## 2022-03-16 PROCEDURE — 85730 THROMBOPLASTIN TIME PARTIAL: CPT

## 2022-03-16 PROCEDURE — 95700 EEG CONT REC W/VID EEG TECH: CPT

## 2022-03-16 PROCEDURE — 93005 ELECTROCARDIOGRAM TRACING: CPT

## 2022-03-16 PROCEDURE — 70551 MRI BRAIN STEM W/O DYE: CPT

## 2022-03-16 PROCEDURE — 83036 HEMOGLOBIN GLYCOSYLATED A1C: CPT

## 2022-03-16 PROCEDURE — 83735 ASSAY OF MAGNESIUM: CPT

## 2022-03-16 PROCEDURE — 80053 COMPREHEN METABOLIC PANEL: CPT

## 2022-03-16 PROCEDURE — 36415 COLL VENOUS BLD VENIPUNCTURE: CPT

## 2022-03-16 PROCEDURE — U0005: CPT

## 2022-03-16 PROCEDURE — U0003: CPT

## 2022-03-16 PROCEDURE — 70496 CT ANGIOGRAPHY HEAD: CPT

## 2022-03-16 PROCEDURE — 82962 GLUCOSE BLOOD TEST: CPT

## 2022-03-16 PROCEDURE — 70498 CT ANGIOGRAPHY NECK: CPT

## 2022-03-16 PROCEDURE — 84484 ASSAY OF TROPONIN QUANT: CPT

## 2022-03-16 PROCEDURE — 80061 LIPID PANEL: CPT

## 2022-03-16 PROCEDURE — 99285 EMERGENCY DEPT VISIT HI MDM: CPT

## 2022-03-16 PROCEDURE — 80048 BASIC METABOLIC PNL TOTAL CA: CPT

## 2022-03-16 RX ORDER — ISOPROPYL ALCOHOL, BENZOCAINE .7; .06 ML/ML; ML/ML
1 SWAB TOPICAL
Qty: 100 | Refills: 1
Start: 2022-03-16 | End: 2022-05-04

## 2022-03-16 RX ADMIN — Medication 81 MILLIGRAM(S): at 11:07

## 2022-03-16 RX ADMIN — Medication 2: at 08:48

## 2022-03-16 NOTE — DISCHARGE NOTE PROVIDER - PROVIDER TOKENS
FREE:[LAST:[Primary Care Provider],PHONE:[(   )    -],FAX:[(   )    -],ESTABLISHEDPATIENT:[T]],PROVIDER:[TOKEN:[[id number]],FOLLOWUP:[1-3 days],ESTABLISHEDPATIENT:[T]] PROVIDER:[TOKEN:[[id number]],FOLLOWUP:[1-3 days],ESTABLISHEDPATIENT:[T]],FREE:[LAST:[Primary Care Provider],PHONE:[(   )    -],FAX:[(   )    -],ESTABLISHEDPATIENT:[T]],PROVIDER:[TOKEN:[[id number]],FOLLOWUP:[1-3 days],ESTABLISHEDPATIENT:[T]]

## 2022-03-16 NOTE — EEG REPORT - NS EEG TEXT BOX
[facility] Epilepsy Monitoring Unit Report  [facility]: [address], [place] [address], Phone [phone] [facility]: [address] [place], [place] [address], [place] [address], Phone [phone] [place] Office: [address], [address], [place] [address] Phone [phone]  [facility]: [address] [place] [address], Phone [phone] [place] Office: [address] [place] [address], Phone [phone]  Patient Name: Blaine Davis   Age: 61 year, : 1960 Patient ID: -, MRN #: -, Sandoval: -  Physician Ordering Inpatient EEG: Jeannette Vinson Referral Source to EMU: non-elective admission – from ER  EMU Study Started: 12:09 on 03/15/22   EMU Study Ended: pending discharge  Study Information:  EEG Recording Technique: The patient underwent continuous Video-EEG monitoring, using Telemetry System hardware on the XLTek Digital System. EEG and video data were stored on a computer hard drive with important events saved in digital archive files. The material was reviewed by a physician (electroencephalographer / epileptologist) on a daily basis. Noble and seizure detection algorithms were utilized and reviewed. An EEG Technician attended to the patient, and was available throughout daytime work hours.  The epilepsy center neurologist was available in person or on call 24-hours per day.  EEG Placement and Labeling of Electrodes: The EEG was performed utilizing 20 channel referential EEG connections (coronal over temporal over parasagittal montage) using all standard 10-20 electrode placements with EKG, with additional electrodes placed in the inferior temporal region using the modified 10-10 montage electrode placements for elective admissions, or if deemed necessary. Recording was at a sampling rate of 256 samples per second per channel. Time synchronized digital video recording was done simultaneously with EEG recording. A low light infrared camera was used for low light recording.     History: 60 yo man with HTN and HLD who presents for further evaluation of headache, left sided tingling, blurry vision and mouth twitching.  Home Antiepileptic Medication and Device None   Interpretation:  Start Date: 3/15/2022 – Day 1                                Start Time – 12:09      Duration – 18h 45m  Daily EEG Visual Analysis Findings: The background was continuous and reactive. During wakefulness, the posterior dominant rhythm consisted of symmetric, well-modulated 10 Hz activity, with amplitude to 30 uV, that attenuated to eye opening.   Background Slowing: No generalized background slowing was present.  Focal Slowing:  Intermittent, independent theta/delta slowing in the left temporal (max F7/T7) region.  Sleep Background: Drowsiness was characterized by fragmentation, attenuation, and slowing of the background activity.   Sleep was characterized by the presence of vertex waves, symmetric sleep spindles and K-complexes.  Other Non-Epileptiform Findings: None were present.  Interictal Epileptiform Activity:  In an area with prominent benign Wicket spikes and focal slowing, rare sharply contoured waveforms in the left temporal region are probably not epileptiform.   BP, 7uV    AVG, 7uV    Events: No events or seizures recorded.  Artifacts: Intermittent myogenic and movement artifacts were noted.  ECG: The heart rate on single channel ECG was predominantly between 70-80 BPM.  ASM: None   EEG Summary: Abnormal EEG in the awake, drowsy and asleep states. - Intermittent, independent theta/delta slowing in the left temporal (max F7/T7) region. - In an area with prominent benign Wicket spikes and focal slowing, rare sharply contoured waveforms in the left temporal region are probably not epileptiform.   Impression/Clinical Correlate: 3/15 – 3/16: no event or seizure  No events or seizures were recorded. Interictal findings suggest functional abnormality in the left temporal region. No definitive epileptiform discharges seen.  ________________________________________  Boogie Cnatu MD Director, Epilepsy/EMU - [facility]

## 2022-03-16 NOTE — DISCHARGE NOTE PROVIDER - NSDCCPCAREPLAN_GEN_ALL_CORE_FT
PRINCIPAL DISCHARGE DIAGNOSIS  Diagnosis: Slurred speech  Assessment and Plan of Treatment: Resolved   follow up with neurology  and ENT continue mediations as perscibed   Keep your weight at a healthy level, limit cholestreol. Follow your diabetic diet and follow perscibed treatments      SECONDARY DISCHARGE DIAGNOSES  Diagnosis: Facial droop  Assessment and Plan of Treatment: Resolving   follow up with ENT     PRINCIPAL DISCHARGE DIAGNOSIS  Diagnosis: Slurred speech  Assessment and Plan of Treatment: Resolved   follow up with neurology  and ENT continue mediations as perscibed   Keep your weight at a healthy level, limit cholestreol. Follow your diabetic diet and follow perscibed treatments      SECONDARY DISCHARGE DIAGNOSES  Diagnosis: Facial droop  Assessment and Plan of Treatment: Resolving   follow up with ENT    Diagnosis: Facial twitching  Assessment and Plan of Treatment:     Diagnosis: Diabetes mellitus  Assessment and Plan of Treatment:

## 2022-03-16 NOTE — DISCHARGE NOTE PROVIDER - ATTENDING DISCHARGE PHYSICAL EXAMINATION:
GENERAL: NAD, well-groomed. left facial and neck muscle twitches+  HEENT: PERRL, +EOMI  NECK: soft, Supple, No JVD,   CHEST/LUNG: Clear to percussion bilaterally; No wheezing  HEART: S1S2+, Regular rate and rhythm; No murmurs  EXTREMITIES:  No clubbing, cyanosis. left leg calf size larger than right, 1+ pitting edema   SKIN: No rashes or lesions  NEURO: AAOX3

## 2022-03-16 NOTE — DISCHARGE NOTE PROVIDER - CARE PROVIDERS DIRECT ADDRESSES
,DirectAddress_Unknown,[email or web address],DirectAddress_[email or web address]

## 2022-03-16 NOTE — DISCHARGE NOTE PROVIDER - CARE PROVIDER_API CALL
Primary Care Provider,   Phone: (   )    -  Fax: (   )    -  Established Patient  Follow Up Time:     Erick Cardona  NEUROTOLOGY  [address]  [place] [address]  Phone: [phone]  Fax: [phone]  Established Patient  Follow Up Time: 1-3 days   Erick Cardona  NEUROTOLOGY  [address]  [place] [address]  Phone: [phone]  Fax: [phone]  Established Patient  Follow Up Time: 1-3 days    Primary Care Provider,   Phone: (   )    -  Fax: (   )    -  Established Patient  Follow Up Time:     Umer Allred  NEUROLOGY  [address], [place] [address]  Phone: [phone]  Fax: [phone]  Established Patient  Follow Up Time: 1-3 days

## 2022-03-16 NOTE — DISCHARGE NOTE NURSING/CASE MANAGEMENT/SOCIAL WORK - PATIENT PORTAL LINK FT
You can access the FollowMyHealth Patient Portal offered by [facility] by registering at the following website: [email or web address]. By joining [facility]’s FollowMyHealth portal, you will also be able to view your health information using other applications (apps) compatible with our system.

## 2022-03-16 NOTE — PROGRESS NOTE ADULT - ASSESSMENT
61yr old male with PMH of DM, HPL presented with sudden onset slurred speech 48hrs ago, headache for last 1 week, left sided facial twitches since yesterday. in ER, he was found to be hypertensive 175/81, CT head- neg for acute CVA is being admitted for further care.       # Slurred speech and Left sided myoclonic jerks - r/o CVA , ?seizures   Associated with HA  MR head - neg for acute CVA  neuro following  asa, statin  CTangio, EEG     # Elevated BP - not diagnosed hypertensive   monitor for now - better this am     # LLE swelling  - neg Duplex     # DM-2 - Controlled - follows with Dr Greene  SSI#2  start CC diet when passes dysphagia screen     # HPL - ct statin, check lipid panel     # DVT Px - Lovenox     
60 yo man with left sided tingling, mouth twitching and intermittent slurred speech of unclear etiology    Left sided tingling/Mouth twitching  No events overnight   cEEG - nonspecific left temp slowing, no definite epileptiform abnormalities  We discussed partial seizure remains in differential and discussed a trial of ASM to see if episodes resolve, however, pt would like to hold off at this time which is a reasonable choice    No further inpt neurologic workup necessary at this time    Follow up with me as an outpatient    Discussed with Dr. Ashley

## 2022-03-16 NOTE — DISCHARGE NOTE PROVIDER - NSDCMRMEDTOKEN_GEN_ALL_CORE_FT
aspirin 81 mg oral tablet: 1 tab(s) orally once a day  glimepiride 1 mg oral tablet:   metFORMIN 500 mg oral tablet:   Multiple Vitamins oral tablet: 1 tab(s) orally once a day  pioglitazone 15 mg oral tablet:   rosuvastatin 20 mg oral tablet:    alcohol swabs : Apply topically to affected area 4 times a day   aspirin 81 mg oral tablet: 1 tab(s) orally once a day  glimepiride 1 mg oral tablet: 1  orally 2 times a day  glucometer (per patient&#x27;s insurance): Test blood sugars four times a day. Dispense #1 glucometer.  lancets: 1 application subcutaneously 4 times a day   metFORMIN 500 mg oral tablet: 1 tab(s) orally 2 times a day  Multiple Vitamins oral tablet: 1 tab(s) orally once a day  pioglitazone 15 mg oral tablet: 1  orally once a day  rosuvastatin 20 mg oral tablet:   test strips (per patient&#x27;s insurance): 1 application subcutaneously 4 times a day. ** Compatible with patient&#x27;s glucometer **

## 2022-03-16 NOTE — DISCHARGE NOTE PROVIDER - HOSPITAL COURSE
61yr old male with PMH of DM, HPL presented with sudden onset slurred speech 48hrs ago, headache for last 1 week, left sided facial twitches since yesterday. in ER, he was found to be hypertensive 175/81, CT head- neg for acute CVA is being admitted for further observation and testing. MR head - neg for acute CVA, neuro followed and negative  EEG no seizure activity noticed.       61yr old male with PMH of DM, HPL presented with sudden onset slurred speech 48hrs ago, headache for last 1 week, left sided facial twitches since yesterday. in ER, he was found to be hypertensive 175/81, CT head- neg for acute CVA is being admitted for further observation and testing. MR head - neg for acute CVA, neuro followed and negative  EEG no seizure activity noticed.     61yr old male with PMH of DM, HPL presented with sudden onset slurred speech 48hrs ago, headache for last 1 week, left sided facial twitches since yesterday. in ER, he was found to be hypertensive 175/81, CT head- neg for acute CVA was  admitted for further observation and testing. MR head - neg for acute CVA, neuro followed and negative  EEG  completed  no seizure activity noticed.  Patients seen at bedside today stable for discharge, reviewed plan, patients verbalized understanding.     61yr old male with PMH of DM, HPL presented with sudden onset slurred speech 48hrs ago, headache for last 1 week, left sided facial twitches since yesterday. in ER, he was found to be hypertensive 175/81, CT head- neg for acute CVA was  admitted for further observation and testing. MR head - neg for acute CVA, neuro followed and negative  EEG  completed  no seizure activity noticed.  Patientnt seen and examined @ the bedside today. Patient clinically stable at this time. No longer requires acute in hospital level of care. Can be continually followed/managed as an outpatient. Please see discharge summary for further information including today's vitals and physical exam.

## 2022-03-16 NOTE — DISCHARGE NOTE NURSING/CASE MANAGEMENT/SOCIAL WORK - NSDCPEFALRISK_GEN_ALL_CORE
For information on Fall & Injury Prevention, visit: [email or web address] OR  [email or web address] OR  https://www.cdc.gov/steadi/patient.html

## 2022-03-16 NOTE — PROGRESS NOTE ADULT - SUBJECTIVE AND OBJECTIVE BOX
ARINA ROBERT    [id number]    61y      Male    CC: Slurred speech, twitches     MR-  neg for acute cva     INTERVAL HPI/OVERNIGHT EVENTS: no acute events     REVIEW OF SYSTEMS:    CONSTITUTIONAL: No fever, weight loss, or fatigue  RESPIRATORY: No cough, wheezing, hemoptysis; No shortness of breath  CARDIOVASCULAR: No chest pain, palpitations  GASTROINTESTINAL: No abdominal or epigastric pain. No nausea, vomiting  NEUROLOGICAL: No headaches, memory loss, loss of strength.  MISCELLANEOUS:      Vital Signs Last 24 Hrs  T(C): 36.5 (15 Mar 2022 08:54), Max: 36.7 (15 Mar 2022 02:24)  T(F): 97.7 (15 Mar 2022 08:54), Max: 98.1 (15 Mar 2022 02:24)  HR: 74 (15 Mar 2022 08:54) (63 - 74)  BP: 142/85 (15 Mar 2022 08:54) (142/85 - 166/89)  BP(mean): --  RR: 18 (15 Mar 2022 08:54) (18 - 18)  SpO2: 96% (15 Mar 2022 08:54) (96% - 98%)    PHYSICAL EXAM:    GENERAL: NAD, well-groomed  HEENT: PERRL, +EOMI  NECK: soft, Supple, No JVD,   CHEST/LUNG: Clear to percussion bilaterally; No wheezing  HEART: S1S2+, Regular rate and rhythm; No murmurs  ABDOMEN: Soft, Nontender, Nondistended; Bowel sounds present  EXTREMITIES:  No clubbing, cyanosis. left leg calf size larger than right, 1+ pitting edema   SKIN: No rashes or lesions  NEURO: AAOX3      LABS:                        13.7   6.54  )-----------( 297      ( 14 Mar 2022 16:02 )             42.2     03-15    137  |  101  |  14.9  ----------------------------<  182<H>  4.0   |  25.0  |  0.68    Ca    8.7      15 Mar 2022 04:13  Mg     2.0     03-14    TPro  7.1  /  Alb  4.3  /  TBili  0.9  /  DBili  x   /  AST  23  /  ALT  40  /  AlkPhos  77  03-14    PT/INR - ( 14 Mar 2022 16:02 )   PT: 10.8 sec;   INR: 0.93 ratio         PTT - ( 14 Mar 2022 16:02 )  PTT:30.3 sec        MEDICATIONS  (STANDING):  aspirin  chewable 81 milliGRAM(s) Oral daily  atorvastatin 80 milliGRAM(s) Oral at bedtime  dextrose 40% Gel 15 Gram(s) Oral once  dextrose 5%. 1000 milliLiter(s) (50 mL/Hr) IV Continuous <Continuous>  dextrose 5%. 1000 milliLiter(s) (100 mL/Hr) IV Continuous <Continuous>  dextrose 50% Injectable 25 Gram(s) IV Push once  dextrose 50% Injectable 12.5 Gram(s) IV Push once  dextrose 50% Injectable 25 Gram(s) IV Push once  enoxaparin Injectable 40 milliGRAM(s) SubCutaneous every 24 hours  glucagon  Injectable 1 milliGRAM(s) IntraMuscular once  insulin lispro (ADMELOG) corrective regimen sliding scale   SubCutaneous three times a day before meals    MEDICATIONS  (PRN):      RADIOLOGY & ADDITIONAL TESTS:  
Neurology Progress Note  [facility] [place] [address] [place] [address]  [phone]    Interval History:  No acute overnight events. cEEG- left temp slowing, no epileptiform abnormalities    HPI 3/15/22:  62 yo man with medical conditions as outlined below who presents for further evaluation of headache, left sided tingling, blurry vision and mouth twitching. He states a week ago, he began to get a 3-4/10 aching occipital headache. He then developed blurry vision when looking and things close to him. He started to feel tingling on the left side  of his face and in his left leg. He states the tingling has remained constant. His wife states that intermittently, he will get slurred speech and then will stop talking or walk away because she feels he does not want her to see him like that. She states he will have a "nervous tic" where he will make a smiling movement and then stop. She states he otherwise has been communicating appropriately. He denies weakness or unsteady gait. He denies recent fever. He denies a personal or family history of epilepsy or seizures. He denies learning difficulty growing up, recent illness, or prior head trauma. He had an MRI brain w/o contrast upon admission which was unremarkable. He has no further complaints.    Meds:  MEDICATIONS  (STANDING):  aspirin  chewable 81 milliGRAM(s) Oral daily  atorvastatin 80 milliGRAM(s) Oral at bedtime  dextrose 40% Gel 15 Gram(s) Oral once  dextrose 5%. 1000 milliLiter(s) (50 mL/Hr) IV Continuous <Continuous>  dextrose 5%. 1000 milliLiter(s) (100 mL/Hr) IV Continuous <Continuous>  dextrose 50% Injectable 25 Gram(s) IV Push once  dextrose 50% Injectable 12.5 Gram(s) IV Push once  dextrose 50% Injectable 25 Gram(s) IV Push once  enoxaparin Injectable 40 milliGRAM(s) SubCutaneous every 24 hours  glucagon  Injectable 1 milliGRAM(s) IntraMuscular once  insulin lispro (ADMELOG) corrective regimen sliding scale   SubCutaneous three times a day before meals    Physical Exam  Vital Signs Last 24 Hrs  T(C): 36.7 (16 Mar 2022 11:18), Max: 36.8 (16 Mar 2022 07:49)  T(F): 98 (16 Mar 2022 11:18), Max: 98.2 (16 Mar 2022 07:49)  HR: 71 (16 Mar 2022 11:18) (69 - 76)  BP: 149/76 (16 Mar 2022 11:18) (113/70 - 149/76)  BP(mean): --  RR: 18 (16 Mar 2022 11:18) (18 - 18)  SpO2: 94% (16 Mar 2022 11:18) (94% - 98%)  General: no acute distress  HEENT:NC/AT  Lungs: no respiratory distress  Skin: no rash or ecchymoses  Lower extremities: no edema    Mental Status: AAO x 3, no dysarthria, no aphasia  CN: PERRL, EOMI, VFF, V1-V3 sensation intact, no facial asymmetry  Motor:  5/5 x 4 extremities  Sensory: intact to light touch throughout  Coordination: no dysmetria on FTN  Gait: deferred    MRI brain w/o contrast - unremarkable, personally reviewed    CTA H/N- unremarkable

## 2022-04-01 ENCOUNTER — RX RENEWAL (OUTPATIENT)
Age: 62
End: 2022-04-01

## 2022-04-18 ENCOUNTER — OUTPATIENT (OUTPATIENT)
Dept: OUTPATIENT SERVICES | Facility: HOSPITAL | Age: 62
LOS: 1 days | End: 2022-04-18

## 2022-04-18 VITALS
DIASTOLIC BLOOD PRESSURE: 80 MMHG | HEIGHT: 74 IN | RESPIRATION RATE: 16 BRPM | TEMPERATURE: 98 F | OXYGEN SATURATION: 98 % | HEART RATE: 78 BPM | WEIGHT: 238.1 LBS | SYSTOLIC BLOOD PRESSURE: 140 MMHG

## 2022-04-18 DIAGNOSIS — H95.01: ICD-10-CM

## 2022-04-18 DIAGNOSIS — E11.9 TYPE 2 DIABETES MELLITUS WITHOUT COMPLICATIONS: ICD-10-CM

## 2022-04-18 DIAGNOSIS — H71.20 CHOLESTEATOMA OF MASTOID, UNSPECIFIED EAR: ICD-10-CM

## 2022-04-18 DIAGNOSIS — Z98.890 OTHER SPECIFIED POSTPROCEDURAL STATES: Chronic | ICD-10-CM

## 2022-04-18 LAB
A1C WITH ESTIMATED AVERAGE GLUCOSE RESULT: 7.6 % — HIGH (ref 4–5.6)
ANION GAP SERPL CALC-SCNC: 15 MMOL/L — HIGH (ref 7–14)
BUN SERPL-MCNC: 14 MG/DL — SIGNIFICANT CHANGE UP (ref 7–23)
CALCIUM SERPL-MCNC: 9.5 MG/DL — SIGNIFICANT CHANGE UP (ref 8.4–10.5)
CHLORIDE SERPL-SCNC: 100 MMOL/L — SIGNIFICANT CHANGE UP (ref 98–107)
CO2 SERPL-SCNC: 23 MMOL/L — SIGNIFICANT CHANGE UP (ref 22–31)
CREAT SERPL-MCNC: 0.72 MG/DL — SIGNIFICANT CHANGE UP (ref 0.5–1.3)
EGFR: 103 ML/MIN/1.73M2 — SIGNIFICANT CHANGE UP
ESTIMATED AVERAGE GLUCOSE: 171 — SIGNIFICANT CHANGE UP
GLUCOSE SERPL-MCNC: 123 MG/DL — HIGH (ref 70–99)
HCT VFR BLD CALC: 43.5 % — SIGNIFICANT CHANGE UP (ref 39–50)
HGB BLD-MCNC: 14.6 G/DL — SIGNIFICANT CHANGE UP (ref 13–17)
MCHC RBC-ENTMCNC: 31.1 PG — SIGNIFICANT CHANGE UP (ref 27–34)
MCHC RBC-ENTMCNC: 33.6 GM/DL — SIGNIFICANT CHANGE UP (ref 32–36)
MCV RBC AUTO: 92.8 FL — SIGNIFICANT CHANGE UP (ref 80–100)
NRBC # BLD: 0 /100 WBCS — SIGNIFICANT CHANGE UP
NRBC # FLD: 0 K/UL — SIGNIFICANT CHANGE UP
PLATELET # BLD AUTO: 298 K/UL — SIGNIFICANT CHANGE UP (ref 150–400)
POTASSIUM SERPL-MCNC: 3.8 MMOL/L — SIGNIFICANT CHANGE UP (ref 3.5–5.3)
POTASSIUM SERPL-SCNC: 3.8 MMOL/L — SIGNIFICANT CHANGE UP (ref 3.5–5.3)
RBC # BLD: 4.69 M/UL — SIGNIFICANT CHANGE UP (ref 4.2–5.8)
RBC # FLD: 13.9 % — SIGNIFICANT CHANGE UP (ref 10.3–14.5)
SODIUM SERPL-SCNC: 138 MMOL/L — SIGNIFICANT CHANGE UP (ref 135–145)
WBC # BLD: 7.18 K/UL — SIGNIFICANT CHANGE UP (ref 3.8–10.5)
WBC # FLD AUTO: 7.18 K/UL — SIGNIFICANT CHANGE UP (ref 3.8–10.5)

## 2022-04-18 NOTE — H&P PST ADULT - HISTORY OF PRESENT ILLNESS
62 yo male with preop dx. recurrent cholesteatoma  presents to pre surgical testing.  Pt with h/o bilateal cholesteatoma s/p bilateral ear surgery, last one done about 15 years ago.  Pt found to have recurrent right ear cholesteatoma during routine ENT visit.  Pt is scheduled for right revision mastoidectomy, cartilage graft.  60 yo male with preop dx. recurrent cholesteatoma  presents to pre surgical testing.  Pt with h/o bilateal cholesteatoma s/p bilateral ear surgery, last one done about 15 years ago.  Pt found to have recurrent right ear cholesteatoma during routine ENT visit.  Pt is scheduled for right revision mastoidectomy, cartilage graft.       Pt was admitted to [facility] 3/14-3/16/22 for left sided facial droop, left facial twisting, and slurred speech, seizure and CVA were ruled out, pt followed up with ENT Dr. Cardona and diagnosed with Bell's Palsy.

## 2022-04-18 NOTE — H&P PST ADULT - NSANTHOSAYNRD_GEN_A_CORE
No. JAZZMINE screening performed.  STOP BANG Legend: 0-2 = LOW Risk; 3-4 = INTERMEDIATE Risk; 5-8 = HIGH Risk

## 2022-04-18 NOTE — H&P PST ADULT - PROBLEM SELECTOR PLAN 2
Pt instructed to hold metformin and pioglitazone on 4/27 PM and 4/28.  Pt instructed to hold glimepiride on 4/28.

## 2022-04-18 NOTE — H&P PST ADULT - NSICDXPASTMEDICALHX_GEN_ALL_CORE_FT
PAST MEDICAL HISTORY:  Bell's palsy 3/22/22, left sided facial drooping    DM (diabetes mellitus) type 2    Hyperlipidemia     Recurrent cholesteatoma of mastoid cavity

## 2022-04-18 NOTE — H&P PST ADULT - MUSCULOSKELETAL
detailed exam normal strength/ROM intact/no joint swelling/no joint erythema/no joint warmth/no calf tenderness details…

## 2022-04-27 ENCOUNTER — TRANSCRIPTION ENCOUNTER (OUTPATIENT)
Age: 62
End: 2022-04-27

## 2022-04-27 VITALS
HEIGHT: 74 IN | OXYGEN SATURATION: 100 % | HEART RATE: 64 BPM | DIASTOLIC BLOOD PRESSURE: 80 MMHG | WEIGHT: 238.1 LBS | SYSTOLIC BLOOD PRESSURE: 150 MMHG | TEMPERATURE: 97 F | RESPIRATION RATE: 18 BRPM

## 2022-04-27 NOTE — ASU PREOPERATIVE ASSESSMENT, ADULT ([facility] ONLY) - FALL HARM RISK - UNIVERSAL INTERVENTIONS
Bed in lowest position, wheels locked, appropriate side rails in place/Call bell, personal items and telephone in reach/Instruct patient to call for assistance before getting out of bed or chair/Non-slip footwear when patient is out of bed/[place] to call system/Physically safe environment - no spills, clutter or unnecessary equipment/Purposeful Proactive Rounding/Room/bathroom lighting operational, light cord in reach

## 2022-04-28 ENCOUNTER — OUTPATIENT (OUTPATIENT)
Dept: OUTPATIENT SERVICES | Facility: HOSPITAL | Age: 62
LOS: 1 days | Discharge: HOME | End: 2022-04-28
Payer: COMMERCIAL

## 2022-04-28 ENCOUNTER — TRANSCRIPTION ENCOUNTER (OUTPATIENT)
Age: 62
End: 2022-04-28

## 2022-04-28 ENCOUNTER — RESULT REVIEW (OUTPATIENT)
Age: 62
End: 2022-04-28

## 2022-04-28 VITALS — OXYGEN SATURATION: 98 % | TEMPERATURE: 98 F | HEART RATE: 80 BPM | RESPIRATION RATE: 16 BRPM

## 2022-04-28 DIAGNOSIS — Z98.890 OTHER SPECIFIED POSTPROCEDURAL STATES: Chronic | ICD-10-CM

## 2022-04-28 DIAGNOSIS — H95.01: ICD-10-CM

## 2022-04-28 LAB — GLUCOSE BLDC GLUCOMTR-MCNC: 176 MG/DL — HIGH (ref 70–99)

## 2022-04-28 PROCEDURE — 88304 TISSUE EXAM BY PATHOLOGIST: CPT | Mod: 26

## 2022-04-28 DEVICE — SURGIFOAM PAD 8CM X 12.5CM X 2MM (100C): Type: IMPLANTABLE DEVICE | Site: RIGHT | Status: FUNCTIONAL

## 2022-04-28 RX ORDER — AMOXICILLIN 250 MG/5ML
1 SUSPENSION, RECONSTITUTED, ORAL (ML) ORAL
Qty: 14 | Refills: 0
Start: 2022-04-28 | End: 2022-05-04

## 2022-04-28 NOTE — ASU DISCHARGE PLAN (ADULT/PEDIATRIC) - NURSING INSTRUCTIONS
You received IV Tylenol for pain management at 1:15pm. Please DO NOT take any Tylenol (Acetaminophen) containing products, such as Vicodin, Percocet, Excedrin, and cold medications for the next 6 hours (until 7:15pm). DO NOT TAKE MORE THAN 3000 MG OF TYLENOL in a 24 hour period.

## 2022-04-28 NOTE — ED PROVIDER NOTE - EMPLOYMENT
TC from .  Had sx, tested neg today.  No known exposure.  Continue working after neg test letter sent to  and manager.    
N/A

## 2022-04-28 NOTE — ASU DISCHARGE PLAN (ADULT/PEDIATRIC) - NS MD DC FALL RISK RISK
For information on Fall & Injury Prevention, visit: [email or web address] OR  [email or web address] OR  https://www.cdc.gov/steadi/patient.html

## 2022-05-05 ENCOUNTER — APPOINTMENT (OUTPATIENT)
Dept: ENDOCRINOLOGY | Facility: CLINIC | Age: 62
End: 2022-05-05
Payer: COMMERCIAL

## 2022-05-05 VITALS
HEIGHT: 74 IN | OXYGEN SATURATION: 95 % | HEART RATE: 82 BPM | SYSTOLIC BLOOD PRESSURE: 128 MMHG | DIASTOLIC BLOOD PRESSURE: 76 MMHG | WEIGHT: 225 LBS | BODY MASS INDEX: 28.88 KG/M2

## 2022-05-05 LAB — GLUCOSE BLDC GLUCOMTR-MCNC: 165

## 2022-05-05 PROCEDURE — 99214 OFFICE O/P EST MOD 30 MIN: CPT | Mod: 25

## 2022-05-05 PROCEDURE — 82962 GLUCOSE BLOOD TEST: CPT

## 2022-05-05 NOTE — ASSESSMENT
[FreeTextEntry1] : Stable but suboptimal control of diabetes. Increase glimepiride to 1 mg two daily. If no improvement wife may need to inject basal insulin; unclear if a dpp-4 would be sufficiently effective\par negative microalbumin\par \par

## 2022-05-05 NOTE — HISTORY OF PRESENT ILLNESS
[FreeTextEntry1] : Type: 2\par Severity: moderate (IR 4.2)\par Duration 8 years:\par Onset: routine BW\par Modifying factors: better with medication\par \par Current meds for glycemic control:\par Glimepiride 1 mg daily - did not increase to two\par Metformin 500 mg, 2 tabs BID -\par Pioglitazone 15 mg two daily\par Unable to tolerate rybelsus due to GI side effects\par Was on Invokana in the past, but caused rash in the groin.\par No SMBG.\par Current BS- 85 pt. reports that he has not ate all day \par \par Does not test very often, usually 120-130 when he does test\par \par Diet:\par B- skips\par L- skips\par D- chicken\par \par Eye exam- 09/2020 (-) \par \par Exercise: none,  so walks a lot at work\par recent ear surgery. A1C 7.6

## 2022-05-06 LAB — SURGICAL PATHOLOGY STUDY: SIGNIFICANT CHANGE UP

## 2022-05-27 ENCOUNTER — RX RENEWAL (OUTPATIENT)
Age: 62
End: 2022-05-27

## 2022-06-21 ENCOUNTER — RX RENEWAL (OUTPATIENT)
Age: 62
End: 2022-06-21

## 2022-07-05 NOTE — H&P PST ADULT - OPHTHALMOLOGIC COMMENTS
c/o fever and congestion since yesterday tmax 105, pt last had tylenol at 7am left eye blurriness related to Bell's palsy

## 2022-09-15 LAB
HBA1C MFR BLD HPLC: 7.5
LDLC SERPL DIRECT ASSAY-MCNC: 69
MICROALBUMIN/CREAT 24H UR-RTO: 11

## 2022-09-16 ENCOUNTER — APPOINTMENT (OUTPATIENT)
Dept: ENDOCRINOLOGY | Facility: CLINIC | Age: 62
End: 2022-09-16

## 2022-09-16 VITALS
WEIGHT: 237 LBS | HEART RATE: 75 BPM | BODY MASS INDEX: 30.42 KG/M2 | OXYGEN SATURATION: 96 % | HEIGHT: 74 IN | DIASTOLIC BLOOD PRESSURE: 80 MMHG | SYSTOLIC BLOOD PRESSURE: 120 MMHG

## 2022-09-16 LAB — GLUCOSE BLDC GLUCOMTR-MCNC: 94

## 2022-09-16 PROCEDURE — 82962 GLUCOSE BLOOD TEST: CPT

## 2022-09-16 PROCEDURE — 99214 OFFICE O/P EST MOD 30 MIN: CPT | Mod: 25

## 2022-09-16 NOTE — ASSESSMENT
[FreeTextEntry1] : Stable but suboptimal but improving control. Maintain current therapy\par negative microalbumin\par hyperlipidemia controlled\par

## 2022-09-16 NOTE — HISTORY OF PRESENT ILLNESS
[FreeTextEntry1] : Type: 2\par Severity: moderate (IR 4.2)\par Duration 8 years:\par Onset: routine BW\par Modifying factors: better with medication\par \par Current meds for glycemic control:\par Glimepiride 1 mg two daily\par Metformin 500 mg, 2 tabs BID -\par Pioglitazone 15 mg two daily\par Unable to tolerate rybelsus due to GI side effects\par Was on Invokana in the past, but caused rash in the groin.\par No SMBG.\par Current BS- 85 pt. reports that he has not ate all day \par \par Does not test very often, usually 120-130 when he does test\par \par Diet:\par B- skips\par L- skips\par D- chicken\par \par Eye exam- 09/2020 (-) \par \par Exercise: none,  so walks a lot at work\par recent ear surgery. A1C 7.6

## 2022-09-26 RX ORDER — PIOGLITAZONE HYDROCHLORIDE 15 MG/1
1 TABLET ORAL
Qty: 0 | Refills: 0 | DISCHARGE

## 2022-09-26 RX ORDER — ASPIRIN/CALCIUM CARB/MAGNESIUM 324 MG
81 TABLET ORAL
Qty: 0 | Refills: 0 | DISCHARGE

## 2022-09-26 RX ORDER — GLIMEPIRIDE 1 MG
0 TABLET ORAL
Qty: 0 | Refills: 0 | DISCHARGE

## 2022-09-26 RX ORDER — ROSUVASTATIN CALCIUM 5 MG/1
0 TABLET ORAL
Qty: 0 | Refills: 0 | DISCHARGE

## 2022-09-26 RX ORDER — ROSUVASTATIN CALCIUM 5 MG/1
1 TABLET ORAL
Qty: 0 | Refills: 0 | DISCHARGE

## 2022-09-26 RX ORDER — GLIMEPIRIDE 1 MG
1 TABLET ORAL
Qty: 0 | Refills: 0 | DISCHARGE

## 2022-09-26 RX ORDER — METFORMIN HYDROCHLORIDE 850 MG/1
1 TABLET ORAL
Qty: 0 | Refills: 0 | DISCHARGE

## 2022-09-26 RX ORDER — ASPIRIN/CALCIUM CARB/MAGNESIUM 324 MG
1 TABLET ORAL
Qty: 0 | Refills: 0 | DISCHARGE

## 2022-09-26 RX ORDER — METFORMIN HYDROCHLORIDE 850 MG/1
2 TABLET ORAL
Qty: 0 | Refills: 0 | DISCHARGE

## 2022-09-26 RX ORDER — PIOGLITAZONE HYDROCHLORIDE 15 MG/1
0 TABLET ORAL
Qty: 0 | Refills: 0 | DISCHARGE

## 2022-09-26 RX ORDER — METFORMIN HYDROCHLORIDE 850 MG/1
0 TABLET ORAL
Qty: 0 | Refills: 0 | DISCHARGE

## 2022-10-31 NOTE — PATIENT PROFILE ADULT - FALL HARM RISK - ATTEMPT OOB
Please follow-up with your family physician and dermatologist for your itchiness. Take steroid prescribed to you for the next 5 days and use Benadryl as well as needed. Take oatmeal bath to help with itching. Return to emergency department if symptoms persist or worsen. No

## 2023-01-03 ENCOUNTER — APPOINTMENT (OUTPATIENT)
Dept: ORTHOPEDIC SURGERY | Facility: CLINIC | Age: 63
End: 2023-01-03
Payer: COMMERCIAL

## 2023-01-03 DIAGNOSIS — S39.012A STRAIN OF MUSCLE, FASCIA AND TENDON OF LOWER BACK, INITIAL ENCOUNTER: ICD-10-CM

## 2023-01-03 PROBLEM — G51.0 BELL'S PALSY: Chronic | Status: ACTIVE | Noted: 2022-04-18

## 2023-01-03 PROBLEM — H71.20: Chronic | Status: ACTIVE | Noted: 2022-04-18

## 2023-01-03 PROBLEM — E11.9 TYPE 2 DIABETES MELLITUS WITHOUT COMPLICATIONS: Chronic | Status: ACTIVE | Noted: 2022-03-14

## 2023-01-03 PROBLEM — E78.5 HYPERLIPIDEMIA, UNSPECIFIED: Chronic | Status: ACTIVE | Noted: 2022-03-14

## 2023-01-03 PROCEDURE — 72100 X-RAY EXAM L-S SPINE 2/3 VWS: CPT

## 2023-01-03 PROCEDURE — 99203 OFFICE O/P NEW LOW 30 MIN: CPT

## 2023-01-03 RX ORDER — CYCLOBENZAPRINE HYDROCHLORIDE 5 MG/1
5 TABLET, FILM COATED ORAL 3 TIMES DAILY
Qty: 15 | Refills: 0 | Status: ACTIVE | COMMUNITY
Start: 2023-01-03 | End: 1900-01-01

## 2023-01-03 RX ORDER — ASPIRIN 81 MG
81 TABLET, DELAYED RELEASE (ENTERIC COATED) ORAL
Refills: 0 | Status: COMPLETED | COMMUNITY
End: 2023-01-03

## 2023-01-03 RX ORDER — ASPIRIN 81 MG/1
81 TABLET, CHEWABLE ORAL
Qty: 100 | Refills: 3 | Status: COMPLETED | COMMUNITY
Start: 2019-07-31 | End: 2023-01-03

## 2023-01-03 NOTE — HISTORY OF PRESENT ILLNESS
[de-identified] : (New Visit)\par \par Patient Details\par -Age: 62\par -Occupation:  \par -Worker’s compensation claim: no\par -No-fault claim: no\par -School injury claim:no \par \par \par Symptom Details \par -Body part: lower middle back \par -Duration of symptoms: a few days\par -How symptoms began:  NKI. denies bowel or bladder dysfunction. pain with sitting. reports a similar episode years ago, resolved with medication \par -Location of pain: low back bilaterally\par -Quality of pain: sharp, stabbing \par -Pain score at rest: 3\par -Pain score with activity: 8\par -Swelling: no\par -Stiffness:yes\par -Radicular symptoms (numbness or radiating pain down extremity): no\par \par \par Treatment Details\par -Activity modification: yes\par -Medication: ibuprofen with mild relief\par -Physical therapy: no\par -Home exercise program: no\par -Injection: no\par -MRI: no\par \par

## 2023-01-03 NOTE — IMAGING
[de-identified] : (Lumbar Spine Examination)\par \par General\par -In no acute distress: yes\par -Alert and oriented to person, place and time: yes\par -Lymphadenopathy: no\par -Peripheral edema: no\par -Peripheral pulses 2+ and symmetric: yes\par \par Inspection\par -Skin intact: yes\par -Swelling: no\par -Gait normal: yes\par \par Palpation\par -Paraspinal tenderness: yes\par -Bony tenderness: no\par -Hip tenderness: no\par -Bony step-off: no\par \par Lumbar Range of Motion\par -Restricted: yes\par -Painful: yes\par \par Strength (bilateral unless otherwise noted)\par -Hip flexion: 5\par -Hip abduction: 5\par -Knee extension: 5\par -Knee flexion: 5\par -Ankle dorsiflexion: 5\par -Ankle plantarflexion: 5\par -Great toe dorsiflexion: 5\par -Great toe plantarflexion: 5\par \par Sensation Intact to Light Touch (bilateral unless otherwise noted)\par -L3: yes\par -L4: yes\par -L5: yes\par -S1: yes\par \par Reflexes (bilateral unless otherwise noted)\par -Patellar: 2+\par -Achilles: 2+\par \par Special Tests (bilateral unless otherwise noted)\par -Sustained clonus: negative\par -Espinosa’s: negative\par -Straight leg raise: negative\par  [No bony abnormalities] : No bony abnormalities [Straightening consistent with spasm] : Straightening consistent with spasm [Facet arthropathy] : Facet arthropathy [Disc space narrowing] : Disc space narrowing

## 2023-01-03 NOTE — DISCUSSION/SUMMARY
[de-identified] : (Assessment)\par \par History, clinical examination and imaging were most consistent with:\par -lumbar paraspinal strain\par \par The diagnosis was explained in detail. The potential non-surgical and surgical treatments were reviewed. The relative risks and benefits of each option were considered relative to the patient’s age, activity level, medical history, symptom severity and previously attempted treatments. \par \par -Non-surgical treatment was selected. The added clinical utility of an MRI was discussed. The patient deferred further diagnostic testing at this time to pursue non-surgical treatment.\par -Patient has diabetes but states it is well controlled and would like to proceed with medrol dose pack. We discussed the risks including hyperglycemia and he will monitor his sugars at home and contact his primary doctor if they are increased.\par -He will begin PT when symptoms improve.\par -If he fails to improve, we would consider MRI and referral to pain management. \par \par \par (Plan)\par \par -Activity modification\par -Physical therapy: script provided\par -Methylprednisolone dose pack: taper as directed\par -Flexeril: three times daily as needed for spasms\par -Follow up: 1 month if failing to improve\par \par \par \par (MDM) \par \par Problem Complexity\par -Low: acute, uncomplicated injury \par \par Risk\par -Low: physical therapy\par -Moderate: prescription medication\par \par The patient was advised to consult with their primary medical provider prior to initiation of any new medications to reduce the risk of adverse effects specific to their long-term home medications and medical history. The risk of gastrointestinal irritation and kidney injury specific to long-term NSAID use was discussed.   \par \par

## 2023-01-05 ENCOUNTER — NON-APPOINTMENT (OUTPATIENT)
Age: 63
End: 2023-01-05

## 2023-01-11 ENCOUNTER — NON-APPOINTMENT (OUTPATIENT)
Age: 63
End: 2023-01-11

## 2023-01-31 LAB
HBA1C MFR BLD HPLC: 8.4
LDLC SERPL DIRECT ASSAY-MCNC: 83
MICROALBUMIN/CREAT 24H UR-RTO: 11
TSH SERPL-ACNC: 2.93

## 2023-02-01 ENCOUNTER — APPOINTMENT (OUTPATIENT)
Dept: ENDOCRINOLOGY | Facility: CLINIC | Age: 63
End: 2023-02-01
Payer: COMMERCIAL

## 2023-02-01 PROCEDURE — 99442: CPT

## 2023-02-01 RX ORDER — METHYLPREDNISOLONE 4 MG/1
4 TABLET ORAL
Qty: 1 | Refills: 0 | Status: DISCONTINUED | COMMUNITY
Start: 2023-01-03 | End: 2023-02-01

## 2023-02-01 RX ORDER — LOSARTAN POTASSIUM 25 MG/1
25 TABLET, FILM COATED ORAL
Refills: 0 | Status: ACTIVE | COMMUNITY

## 2023-02-01 NOTE — ASSESSMENT
[FreeTextEntry1] : DM type 2, losing control for no obvious reason, Will try another sglt-2 (jardiance) and consider reducing metformin to avoid pill burden (a goal for pt)\par hyperlipidemia controlled\par

## 2023-02-01 NOTE — HISTORY OF PRESENT ILLNESS
[Home] : at home, [unfilled] , at the time of the visit. [Other Location: e.g. Home (Enter Location, City,State)___] : at [unfilled] [Verbal consent obtained from patient] : the patient, [unfilled] [FreeTextEntry1] : Type: 2\par Severity: moderate (IR 4.2)\par Duration 9 years:\par Onset: routine BW\par Modifying factors: better with medication\par \par Current meds for glycemic control:\par Glimepiride 1 mg two daily\par Metformin 500 mg, 2 tabs BID -\par Pioglitazone 15 mg two daily\par Unable to tolerate rybelsus due to GI side effects\par Was on Invokana in the past, but caused rash in the groin.\par No SMBG.\par Current BS- 85 pt. reports that he has not ate all day \par \par Does not test very often, usually 120-130 when he does test\par \par Diet:\par B- skips\par L- skips\par D- chicken\par \par Eye exam- 09/2020 (-) \par \par Exercise: none,  so walks a lot at work\par recent ear surgery. A1C 7.6

## 2023-02-17 NOTE — H&P PST ADULT - PROBLEM SELECTOR PLAN 1
MED
Pt is scheduled for right revision mastoidectomy, cartilage graft on 4/28/22.  Verbal and written pre op instructions reviewed with patient and pt able to verbalize understanding.   Pt instructed to follow surgeon's guidelines regarding COVID testing preop.

## 2023-04-11 ENCOUNTER — RX RENEWAL (OUTPATIENT)
Age: 63
End: 2023-04-11

## 2023-04-11 RX ORDER — ROSUVASTATIN CALCIUM 20 MG/1
20 TABLET, FILM COATED ORAL DAILY
Qty: 90 | Refills: 3 | Status: ACTIVE | COMMUNITY
Start: 2020-03-28 | End: 1900-01-01

## 2023-05-02 LAB
HBA1C MFR BLD HPLC: 8
LDLC SERPL DIRECT ASSAY-MCNC: 86
MICROALBUMIN/CREAT 24H UR-RTO: 35
TSH SERPL-ACNC: 3.81

## 2023-05-03 ENCOUNTER — APPOINTMENT (OUTPATIENT)
Dept: ENDOCRINOLOGY | Facility: CLINIC | Age: 63
End: 2023-05-03
Payer: COMMERCIAL

## 2023-05-03 VITALS
SYSTOLIC BLOOD PRESSURE: 118 MMHG | HEIGHT: 74 IN | BODY MASS INDEX: 29.39 KG/M2 | HEART RATE: 74 BPM | DIASTOLIC BLOOD PRESSURE: 78 MMHG | WEIGHT: 229 LBS | OXYGEN SATURATION: 99 %

## 2023-05-03 DIAGNOSIS — E78.00 PURE HYPERCHOLESTEROLEMIA, UNSPECIFIED: ICD-10-CM

## 2023-05-03 LAB — GLUCOSE BLDC GLUCOMTR-MCNC: 179

## 2023-05-03 PROCEDURE — 99214 OFFICE O/P EST MOD 30 MIN: CPT | Mod: 25

## 2023-05-03 PROCEDURE — 82962 GLUCOSE BLOOD TEST: CPT

## 2023-05-03 NOTE — ASSESSMENT
[FreeTextEntry1] : DM type 2, some improvement in control with jardiance. Will increase to 25 mg\par hyperlipidemia controlled on high potency statin\par

## 2023-05-03 NOTE — HISTORY OF PRESENT ILLNESS
[FreeTextEntry1] : Type: 2\par Severity: moderate (IR 4.2)\par Duration 9 years:\par Onset: routine BW\par Modifying factors: better with medication\par \par Current meds for glycemic control:\par Glimepiride 1 mg two daily\par Metformin 500 mg, 2 tabs daily\par Pioglitazone 15 mg two daily\par jardiance 10\par Unable to tolerate rybelsus due to GI side effects\par Was on Invokana in the past, but caused rash in the groin.\par No SMBG.\par \par \par Eye exam- 09/2020 (-) \par \par Exercise: none,  so walks a lot at work\par recent ear surgery. \par \par DVT, on xarelto\par ASHD on imaging, cath did not reveal a need for a stent

## 2023-05-10 ENCOUNTER — RX RENEWAL (OUTPATIENT)
Age: 63
End: 2023-05-10

## 2023-05-21 ENCOUNTER — RX RENEWAL (OUTPATIENT)
Age: 63
End: 2023-05-21

## 2023-06-07 ENCOUNTER — RX RENEWAL (OUTPATIENT)
Age: 63
End: 2023-06-07

## 2023-06-07 RX ORDER — ASPIRIN 81 MG/1
81 TABLET, CHEWABLE ORAL
Qty: 90 | Refills: 3 | Status: ACTIVE | COMMUNITY
Start: 2022-06-21 | End: 1900-01-01

## 2023-10-08 ENCOUNTER — RX RENEWAL (OUTPATIENT)
Age: 63
End: 2023-10-08

## 2023-11-28 LAB
HBA1C MFR BLD HPLC: 7.7
LDLC SERPL DIRECT ASSAY-MCNC: 86
MICROALBUMIN/CREAT 24H UR-RTO: 25
TSH SERPL-ACNC: 3.27

## 2023-11-29 ENCOUNTER — APPOINTMENT (OUTPATIENT)
Dept: ENDOCRINOLOGY | Facility: CLINIC | Age: 63
End: 2023-11-29
Payer: COMMERCIAL

## 2023-11-29 VITALS
HEART RATE: 81 BPM | HEIGHT: 74 IN | WEIGHT: 219 LBS | OXYGEN SATURATION: 97 % | DIASTOLIC BLOOD PRESSURE: 70 MMHG | BODY MASS INDEX: 28.11 KG/M2 | SYSTOLIC BLOOD PRESSURE: 110 MMHG

## 2023-11-29 DIAGNOSIS — E78.2 MIXED HYPERLIPIDEMIA: ICD-10-CM

## 2023-11-29 LAB — GLUCOSE BLDC GLUCOMTR-MCNC: 147

## 2023-11-29 PROCEDURE — 82962 GLUCOSE BLOOD TEST: CPT

## 2023-11-29 PROCEDURE — 99214 OFFICE O/P EST MOD 30 MIN: CPT | Mod: 25

## 2023-11-29 RX ORDER — GLIMEPIRIDE 1 MG/1
1 TABLET ORAL
Qty: 180 | Refills: 3 | Status: ACTIVE | COMMUNITY
Start: 2020-06-15 | End: 1900-01-01

## 2023-11-29 RX ORDER — PIOGLITAZONE HYDROCHLORIDE 15 MG/1
15 TABLET ORAL
Qty: 90 | Refills: 3 | Status: ACTIVE | COMMUNITY
Start: 2019-05-29 | End: 1900-01-01

## 2024-03-07 ENCOUNTER — RX RENEWAL (OUTPATIENT)
Age: 64
End: 2024-03-07

## 2024-03-07 RX ORDER — METFORMIN ER 500 MG 500 MG/1
500 TABLET ORAL
Qty: 360 | Refills: 3 | Status: ACTIVE | COMMUNITY
Start: 2019-09-30 | End: 1900-01-01

## 2024-05-08 ENCOUNTER — RX RENEWAL (OUTPATIENT)
Age: 64
End: 2024-05-08

## 2024-06-03 ENCOUNTER — RX RENEWAL (OUTPATIENT)
Age: 64
End: 2024-06-03

## 2024-06-03 RX ORDER — ASPIRIN 81 MG/1
81 TABLET, CHEWABLE ORAL
Qty: 90 | Refills: 3 | Status: ACTIVE | COMMUNITY
Start: 2021-06-15 | End: 1900-01-01

## 2024-06-04 LAB
HBA1C MFR BLD HPLC: 7.6
LDLC SERPL DIRECT ASSAY-MCNC: 67
MICROALBUMIN/CREAT 24H UR-RTO: 14
TSH SERPL-ACNC: 2.96

## 2024-06-05 ENCOUNTER — APPOINTMENT (OUTPATIENT)
Dept: ENDOCRINOLOGY | Facility: CLINIC | Age: 64
End: 2024-06-05
Payer: COMMERCIAL

## 2024-06-05 VITALS
DIASTOLIC BLOOD PRESSURE: 72 MMHG | HEIGHT: 74 IN | WEIGHT: 222 LBS | BODY MASS INDEX: 28.49 KG/M2 | HEART RATE: 82 BPM | SYSTOLIC BLOOD PRESSURE: 120 MMHG | OXYGEN SATURATION: 97 %

## 2024-06-05 DIAGNOSIS — R79.89 OTHER SPECIFIED ABNORMAL FINDINGS OF BLOOD CHEMISTRY: ICD-10-CM

## 2024-06-05 DIAGNOSIS — E11.65 TYPE 2 DIABETES MELLITUS WITH HYPERGLYCEMIA: ICD-10-CM

## 2024-06-05 LAB — GLUCOSE BLDC GLUCOMTR-MCNC: 139

## 2024-06-05 PROCEDURE — 82962 GLUCOSE BLOOD TEST: CPT

## 2024-06-05 PROCEDURE — G2211 COMPLEX E/M VISIT ADD ON: CPT

## 2024-06-05 PROCEDURE — 99214 OFFICE O/P EST MOD 30 MIN: CPT

## 2024-06-05 RX ORDER — RIVAROXABAN 20 MG/1
20 TABLET, FILM COATED ORAL
Refills: 0 | Status: DISCONTINUED | COMMUNITY
End: 2024-06-05

## 2024-06-05 RX ORDER — EMPAGLIFLOZIN 25 MG/1
25 TABLET, FILM COATED ORAL
Qty: 90 | Refills: 3 | Status: ACTIVE | COMMUNITY
Start: 2023-02-01 | End: 1900-01-01

## 2024-06-05 NOTE — ASSESSMENT
[FreeTextEntry1] : DM type 2, stable control but with room for improvement. Observe longer on the current regimen r/o hypogonadism, labs ordered

## 2024-06-12 ENCOUNTER — RX RENEWAL (OUTPATIENT)
Age: 64
End: 2024-06-12

## 2024-06-12 RX ORDER — SILDENAFIL 100 MG/1
100 TABLET, FILM COATED ORAL
Qty: 8 | Refills: 3 | Status: ACTIVE | COMMUNITY
Start: 2018-10-15 | End: 1900-01-01

## 2024-10-08 LAB
HBA1C MFR BLD HPLC: 7.6
LDLC SERPL DIRECT ASSAY-MCNC: 74
MICROALBUMIN/CREAT 24H UR-RTO: 16

## 2024-10-09 ENCOUNTER — APPOINTMENT (OUTPATIENT)
Dept: ENDOCRINOLOGY | Facility: CLINIC | Age: 64
End: 2024-10-09
Payer: COMMERCIAL

## 2024-10-09 VITALS
HEIGHT: 74 IN | HEART RATE: 68 BPM | WEIGHT: 231 LBS | DIASTOLIC BLOOD PRESSURE: 80 MMHG | SYSTOLIC BLOOD PRESSURE: 124 MMHG | BODY MASS INDEX: 29.65 KG/M2 | OXYGEN SATURATION: 97 %

## 2024-10-09 DIAGNOSIS — R79.89 OTHER SPECIFIED ABNORMAL FINDINGS OF BLOOD CHEMISTRY: ICD-10-CM

## 2024-10-09 DIAGNOSIS — E78.2 MIXED HYPERLIPIDEMIA: ICD-10-CM

## 2024-10-09 DIAGNOSIS — E11.65 TYPE 2 DIABETES MELLITUS WITH HYPERGLYCEMIA: ICD-10-CM

## 2024-10-09 LAB — GLUCOSE BLDC GLUCOMTR-MCNC: 108

## 2024-10-09 PROCEDURE — 82962 GLUCOSE BLOOD TEST: CPT

## 2024-10-09 PROCEDURE — 99214 OFFICE O/P EST MOD 30 MIN: CPT

## 2024-12-18 ENCOUNTER — RX RENEWAL (OUTPATIENT)
Age: 64
End: 2024-12-18

## 2025-01-08 ENCOUNTER — APPOINTMENT (OUTPATIENT)
Dept: ENDOCRINOLOGY | Facility: CLINIC | Age: 65
End: 2025-01-08

## 2025-04-03 ENCOUNTER — RX RENEWAL (OUTPATIENT)
Age: 65
End: 2025-04-03

## 2025-04-23 ENCOUNTER — OFFICE (OUTPATIENT)
Facility: LOCATION | Age: 65
Setting detail: OPHTHALMOLOGY
End: 2025-04-23
Payer: COMMERCIAL

## 2025-04-23 DIAGNOSIS — H31.29: ICD-10-CM

## 2025-04-23 DIAGNOSIS — H25.13: ICD-10-CM

## 2025-04-23 DIAGNOSIS — H16.223: ICD-10-CM

## 2025-04-23 DIAGNOSIS — H11.152: ICD-10-CM

## 2025-04-23 DIAGNOSIS — E11.9: ICD-10-CM

## 2025-04-23 PROCEDURE — 92004 COMPRE OPH EXAM NEW PT 1/>: CPT | Performed by: OPHTHALMOLOGY

## 2025-04-23 PROCEDURE — 92250 FUNDUS PHOTOGRAPHY W/I&R: CPT | Performed by: OPHTHALMOLOGY

## 2025-04-23 ASSESSMENT — TONOMETRY
OS_IOP_MMHG: 18
OD_IOP_MMHG: 18

## 2025-04-23 ASSESSMENT — PACHYMETRY
OS_CT_UM: 600
OS_CT_CORRECTION: -4
OD_CT_CORRECTION: -4
OD_CT_UM: 601

## 2025-04-23 ASSESSMENT — CONFRONTATIONAL VISUAL FIELD TEST (CVF)
OD_FINDINGS: FULL
OS_FINDINGS: FULL

## 2025-04-23 ASSESSMENT — REFRACTION_MANIFEST
OD_SPHERE: -0.25
OS_VA1: 20/20
OD_CYLINDER: -0.50
OD_VA1: 20/20
OD_ADD: +2.00
OS_AXIS: 090
OS_CYLINDER: -1.00
OD_AXIS: 090
OS_SPHERE: +0.75
OS_ADD: +2.00

## 2025-04-23 ASSESSMENT — SUPERFICIAL PUNCTATE KERATITIS (SPK)
OS_SPK: 1+
OD_SPK: T

## 2025-04-23 ASSESSMENT — REFRACTION_CURRENTRX
OS_CYLINDER: -1.00
OS_AXIS: 075
OS_SPHERE: PLANO
OD_AXIS: 077
OD_SPHERE: -0.50
OS_OVR_VA: 20/
OD_VPRISM_DIRECTION: SV
OD_OVR_VA: 20/
OS_VPRISM_DIRECTION: SV
OD_CYLINDER: -0.75

## 2025-04-23 ASSESSMENT — KERATOMETRY
OD_AXISANGLE_DEGREES: 012
OD_K1POWER_DIOPTERS: 41.50
OS_K2POWER_DIOPTERS: 42.25
OS_AXISANGLE_DEGREES: 144
OS_K1POWER_DIOPTERS: 41.50
OD_K2POWER_DIOPTERS: 41.75

## 2025-04-23 ASSESSMENT — REFRACTION_AUTOREFRACTION
OS_SPHERE: +0.25
OS_CYLINDER: +1.00
OD_AXIS: 167
OD_SPHERE: -0.50
OD_CYLINDER: +0.75
OS_AXIS: 170

## 2025-04-23 ASSESSMENT — VISUAL ACUITY
OD_BCVA: 20/25-2
OS_BCVA: 20/20

## 2025-05-07 ENCOUNTER — APPOINTMENT (OUTPATIENT)
Dept: ORTHOPEDIC SURGERY | Facility: CLINIC | Age: 65
End: 2025-05-07
Payer: COMMERCIAL

## 2025-05-07 VITALS — HEIGHT: 74 IN | BODY MASS INDEX: 29.52 KG/M2 | WEIGHT: 230 LBS

## 2025-05-07 DIAGNOSIS — I10 ESSENTIAL (PRIMARY) HYPERTENSION: ICD-10-CM

## 2025-05-07 DIAGNOSIS — M54.2 CERVICALGIA: ICD-10-CM

## 2025-05-07 DIAGNOSIS — R26.9 UNSPECIFIED ABNORMALITIES OF GAIT AND MOBILITY: ICD-10-CM

## 2025-05-07 DIAGNOSIS — E11.9 TYPE 2 DIABETES MELLITUS W/OUT COMPLICATIONS: ICD-10-CM

## 2025-05-07 PROCEDURE — 99214 OFFICE O/P EST MOD 30 MIN: CPT

## 2025-05-07 PROCEDURE — 99204 OFFICE O/P NEW MOD 45 MIN: CPT

## 2025-05-26 ENCOUNTER — RX RENEWAL (OUTPATIENT)
Age: 65
End: 2025-05-26

## 2025-05-27 ENCOUNTER — RESULT REVIEW (OUTPATIENT)
Age: 65
End: 2025-05-27

## 2025-06-03 LAB
HBA1C MFR BLD HPLC: 8.2
LDLC SERPL DIRECT ASSAY-MCNC: 72
MICROALBUMIN/CREAT 24H UR-RTO: 8
TSH SERPL-ACNC: 1.88

## 2025-06-04 ENCOUNTER — APPOINTMENT (OUTPATIENT)
Dept: ENDOCRINOLOGY | Facility: CLINIC | Age: 65
End: 2025-06-04
Payer: COMMERCIAL

## 2025-06-04 ENCOUNTER — APPOINTMENT (OUTPATIENT)
Dept: ORTHOPEDIC SURGERY | Facility: CLINIC | Age: 65
End: 2025-06-04
Payer: COMMERCIAL

## 2025-06-04 VITALS
DIASTOLIC BLOOD PRESSURE: 80 MMHG | HEART RATE: 81 BPM | SYSTOLIC BLOOD PRESSURE: 120 MMHG | WEIGHT: 230 LBS | OXYGEN SATURATION: 95 % | HEIGHT: 74 IN | BODY MASS INDEX: 29.52 KG/M2

## 2025-06-04 VITALS — WEIGHT: 230 LBS | BODY MASS INDEX: 29.52 KG/M2 | HEIGHT: 74 IN

## 2025-06-04 DIAGNOSIS — E78.2 MIXED HYPERLIPIDEMIA: ICD-10-CM

## 2025-06-04 DIAGNOSIS — R79.89 OTHER SPECIFIED ABNORMAL FINDINGS OF BLOOD CHEMISTRY: ICD-10-CM

## 2025-06-04 DIAGNOSIS — E11.65 TYPE 2 DIABETES MELLITUS WITH HYPERGLYCEMIA: ICD-10-CM

## 2025-06-04 DIAGNOSIS — E78.00 PURE HYPERCHOLESTEROLEMIA, UNSPECIFIED: ICD-10-CM

## 2025-06-04 LAB — GLUCOSE BLDC GLUCOMTR-MCNC: 130

## 2025-06-04 PROCEDURE — 99213 OFFICE O/P EST LOW 20 MIN: CPT

## 2025-06-04 PROCEDURE — 99214 OFFICE O/P EST MOD 30 MIN: CPT

## 2025-06-04 PROCEDURE — 82962 GLUCOSE BLOOD TEST: CPT

## 2025-08-22 ENCOUNTER — RX RENEWAL (OUTPATIENT)
Age: 65
End: 2025-08-22

## 2025-09-16 LAB
HBA1C MFR BLD HPLC: 8.1
LDLC SERPL DIRECT ASSAY-MCNC: 63
MICROALBUMIN/CREAT 24H UR-RTO: 21
TSH SERPL-ACNC: 4.54

## 2025-09-17 ENCOUNTER — APPOINTMENT (OUTPATIENT)
Dept: ENDOCRINOLOGY | Facility: CLINIC | Age: 65
End: 2025-09-17
Payer: COMMERCIAL

## 2025-09-17 VITALS
SYSTOLIC BLOOD PRESSURE: 122 MMHG | HEART RATE: 80 BPM | WEIGHT: 230 LBS | DIASTOLIC BLOOD PRESSURE: 68 MMHG | OXYGEN SATURATION: 96 % | HEIGHT: 74 IN | BODY MASS INDEX: 29.52 KG/M2

## 2025-09-17 DIAGNOSIS — R79.89 OTHER SPECIFIED ABNORMAL FINDINGS OF BLOOD CHEMISTRY: ICD-10-CM

## 2025-09-17 DIAGNOSIS — E11.65 TYPE 2 DIABETES MELLITUS WITH HYPERGLYCEMIA: ICD-10-CM

## 2025-09-17 DIAGNOSIS — E78.2 MIXED HYPERLIPIDEMIA: ICD-10-CM

## 2025-09-17 LAB — GLUCOSE BLDC GLUCOMTR-MCNC: 290

## 2025-09-17 PROCEDURE — 99214 OFFICE O/P EST MOD 30 MIN: CPT

## 2025-09-17 PROCEDURE — 82962 GLUCOSE BLOOD TEST: CPT

## 2025-09-17 RX ORDER — SEMAGLUTIDE 0.68 MG/ML
2 INJECTION, SOLUTION SUBCUTANEOUS
Qty: 1 | Refills: 2 | Status: ACTIVE | COMMUNITY
Start: 2025-09-17 | End: 1900-01-01

## (undated) DEVICE — POSITIONER STRAP ARMBOARD VELCRO TS-30

## (undated) DEVICE — BLADE SICKLE EDGE STRAIGHT 84MM

## (undated) DEVICE — DRILL BIT ANSPACH DIAMOND BALL 3MMX5CM

## (undated) DEVICE — BLADE TYMPANOPLASTY 2.5MM W 60 DEGREE BEVEL DOWN

## (undated) DEVICE — TUBING IRRIGATION HF NAVIO

## (undated) DEVICE — TUBING COOLANT

## (undated) DEVICE — BUR MEDTRONIC ENT VISAO ROUND FINE DIAMOND LONG 3.0MM X 72MM

## (undated) DEVICE — PREP BETADINE SPONGE STICKS

## (undated) DEVICE — PACK NASAL

## (undated) DEVICE — CAM-ESU FORCE FX VALLEYLAB CAUTERY 019523: Type: DURABLE MEDICAL EQUIPMENT

## (undated) DEVICE — BEAVER BLADE MINI SHARP ALL ROUND (BLUE)

## (undated) DEVICE — CANISTER DISPOSABLE THIN WALL 3000CC

## (undated) DEVICE — BUR MEDTRONIC ENT VISAO ROUND FINE DIAMOND LONG 1.0MM X 72MM

## (undated) DEVICE — DRAPE TOWEL BLUE 17" X 24"

## (undated) DEVICE — SUT VICRYL 3-0 27" RB-1 UNDYED

## (undated) DEVICE — DRSG STERISTRIPS 0.25 X 4"

## (undated) DEVICE — TUBING IRRIGATION

## (undated) DEVICE — BEAVER BLADE MINI (ORANGE)

## (undated) DEVICE — SOL IRR POUR H2O 1500ML

## (undated) DEVICE — BUR MEDTRONIC ENT ULTRA ROUND CUTTING LONG 3 X 72MM

## (undated) DEVICE — VENODYNE/SCD SLEEVE CALF MEDIUM

## (undated) DEVICE — WARMING BLANKET LOWER ADULT

## (undated) DEVICE — BUR MEDTRONIC ENT VISAO ROUND FINE DIAMOND X- LONG 2.0MM X 78MM

## (undated) DEVICE — COTTONBALL LG

## (undated) DEVICE — SOL IRR POUR NS 0.9% 500ML

## (undated) DEVICE — DRAPE SPLIT SHEET 77" X 120"

## (undated) DEVICE — CLIP IRRIGATIION FOR QD8/QD5S ANGLE ATTACHMENT

## (undated) DEVICE — DRSG MASTISOL

## (undated) DEVICE — DRILL BIT ANSPACH FLUTED ACORN 5MMX25.4MM

## (undated) DEVICE — TAPE UMBILICAL 1/8 X 18" STRANDS

## (undated) DEVICE — DRSG TELFA 3 X 8

## (undated) DEVICE — SUT MONOCRYL 4-0 18" P-3 UNDYED

## (undated) DEVICE — BUR MEDTRONIC VISAO ROUND FINE DIAMOND 1.5MM LONG

## (undated) DEVICE — ELCTR GROUNDING PAD ADULT COVIDIEN

## (undated) DEVICE — BUR MEDTRONIC ENT ULTRA ROUND CUTTING LONG FLUTED 6.0 X 69MM

## (undated) DEVICE — DRILL BIT ANSPACH DIAMOND BALL 5MMX5CM

## (undated) DEVICE — BUR MEDTRONIC ENT VISAO ROUND FINE DIAMOND LONG 4.0MM X 72MM

## (undated) DEVICE — DRILL BIT ANSPACH DIAMOND BALL 2MMX5CM

## (undated) DEVICE — NDL HYPO SAFE 25G X 5/8" (ORANGE)

## (undated) DEVICE — LABELS BLANK W PEN